# Patient Record
Sex: FEMALE | Race: WHITE | Employment: FULL TIME | URBAN - METROPOLITAN AREA
[De-identification: names, ages, dates, MRNs, and addresses within clinical notes are randomized per-mention and may not be internally consistent; named-entity substitution may affect disease eponyms.]

---

## 2017-08-02 ENCOUNTER — OFFICE VISIT (OUTPATIENT)
Dept: FAMILY MEDICINE CLINIC | Age: 52
End: 2017-08-02

## 2017-08-02 VITALS
HEIGHT: 62 IN | DIASTOLIC BLOOD PRESSURE: 73 MMHG | WEIGHT: 133 LBS | BODY MASS INDEX: 24.48 KG/M2 | RESPIRATION RATE: 16 BRPM | TEMPERATURE: 98.5 F | SYSTOLIC BLOOD PRESSURE: 107 MMHG | OXYGEN SATURATION: 98 % | HEART RATE: 77 BPM

## 2017-08-02 DIAGNOSIS — Z80.3 FH: BREAST CANCER: ICD-10-CM

## 2017-08-02 DIAGNOSIS — R32 URINARY INCONTINENCE, UNSPECIFIED TYPE: ICD-10-CM

## 2017-08-02 DIAGNOSIS — Z12.11 COLON CANCER SCREENING: ICD-10-CM

## 2017-08-02 DIAGNOSIS — Z00.00 HEALTHCARE MAINTENANCE: Primary | ICD-10-CM

## 2017-08-02 DIAGNOSIS — E55.9 UNSPECIFIED VITAMIN D DEFICIENCY: ICD-10-CM

## 2017-08-02 DIAGNOSIS — Z98.890 S/P COLONOSCOPY: ICD-10-CM

## 2017-08-02 DIAGNOSIS — Z12.31 ENCOUNTER FOR SCREENING MAMMOGRAM FOR BREAST CANCER: ICD-10-CM

## 2017-08-02 DIAGNOSIS — H00.015 HORDEOLUM EXTERNUM OF LEFT LOWER EYELID: ICD-10-CM

## 2017-08-02 DIAGNOSIS — E11.9 TYPE 2 DIABETES MELLITUS WITHOUT COMPLICATION, UNSPECIFIED LONG TERM INSULIN USE STATUS: ICD-10-CM

## 2017-08-02 DIAGNOSIS — Z00.00 HEALTH CARE MAINTENANCE: Primary | ICD-10-CM

## 2017-08-02 LAB
ALBUMIN UR QL STRIP: 30 MG/L
CREATININE, URINE POC: 200 MG/DL
MICROALBUMIN/CREAT RATIO POC: <30 MG/G

## 2017-08-02 RX ORDER — GENTAMICIN SULFATE 3 MG/ML
2 SOLUTION/ DROPS OPHTHALMIC 3 TIMES DAILY
Qty: 1 BOTTLE | Refills: 0 | Status: SHIPPED | OUTPATIENT
Start: 2017-08-02 | End: 2017-08-07

## 2017-08-02 NOTE — LETTER
8/2/2017 3:43 PM 
 
Ms. Stacie Castano [address] [facility] [phone] Focus on regular exercise (150 minutes each week) and healthy eating. Eat more fruits and vegetables. Eat more protein (egg whites, beans, and nuts you know you tolerate) and less carbohydrates (white bread, white rice, white pasta, white potatoes, sodas, and sweets). Eat appropriately small portion sizes. Keep up with female exams Return your stool cards when ready Flu shot this fall Eye exam yearly Obtain mammogram 
 
Please call Madeline Malone to help arrange and authorize any tests and/or referrals. Her # is [phone] Central Scheduling at [facility] is #[phone] Sincerely, Vanessa Watkins MD

## 2017-08-02 NOTE — PATIENT INSTRUCTIONS
In general, I advise patients to be as active as possible. I believe exercise is the key to long life and good health. The current recommendation is for individuals to exercise for 150 minutes each week (in other words 30 minutes 5 days a week). Exercise should be vigorous enough to work up a sweat. These activities include brisk walking, running, tennis, swimming, weight-lifting, etc.     I usually tell folks that work is work and exercise is exercise. Each of these activities has a different goal.  So build dedicated exercise time into your routine. Eat more protein (egg whites, beans, and nuts you know for sure that you tolerate) and less carbohydrates (white bread, white rice, white pasta, white potatoes, sodas, and sweets). Eat appropriately small portion sizes. You may also wish to look into the Mediterranean or the DASH Diet:     If any patient drinks alcohol, I typically suggest that a male drink no more than 2 beers (or glasses of wine, or shots of liquor) in any 24 hour period (and not daily). For females, the limits are one drink per 24 hours (and not daily). After these limits, the toxic effects of alcohol consumption start to manifest.     Avoid tobacco products. I can provide separate instructions for smoking cessation strategies if needed. I suggest a wellness exam yearly during your birth month to update health maintenance issues such as fasting labs, EKGs and other studies, appropriate cancer screenings,  immunizations, etc.      I suggest a yearly flu shot    If needed, please call Noemy Tucker to help arrange and authorize any tests or referrals. Her # is [phone]. Tests at [facility] can be scheduled by calling #[phone].

## 2017-08-02 NOTE — MR AVS SNAPSHOT
Visit Information Date & Time Provider Department Dept. Phone Encounter #  
 8/2/2017  4:00 PM Jamshid Hernandez MD [facility] [phone] [id number] Upcoming Health Maintenance Date Due Pneumococcal 19-64 Medium Risk (1 of 1 - PPSV23) 3/13/1982 EYE EXAM RETINAL OR DILATED Q1 1/19/2016 FOBT Q 1 YEAR AGE 50-75 2/19/2016 BREAST CANCER SCRN MAMMOGRAM 7/1/2016 HEMOGLOBIN A1C Q6M 11/3/2016 FOOT EXAM Q1 5/3/2017 MICROALBUMIN Q1 5/3/2017 LIPID PANEL Q1 5/3/2017 INFLUENZA AGE 9 TO ADULT 10/31/2017* PAP AKA CERVICAL CYTOLOGY 8/2/2018* DTaP/Tdap/Td series (2 - Td) 1/11/2022 *Topic was postponed. The date shown is not the original due date. Allergies as of 8/2/2017  Review Complete On: 8/2/2017 By: Rob Lopez LPN No Known Allergies Current Immunizations  Reviewed on 2/19/2015 Name Date Influenza Vaccine 11/10/2014 Influenza Vaccine Split 10/9/2012, 10/1/2011 Pneumococcal Vaccine (Pcv) 12/11/2008 TD Vaccine 2/2/2000 TDAP Vaccine 1/11/2012 Not reviewed this visit You Were Diagnosed With   
  
 Codes Comments Healthcare maintenance    -  Primary ICD-10-CM: Z00.00 ICD-9-CM: V70.0 Hordeolum externum of left lower eyelid     ICD-10-CM: H00.015 
ICD-9-CM: 373.11 Urinary incontinence, unspecified type     ICD-10-CM: R32 
ICD-9-CM: 788.30 S/P colonoscopy     ICD-10-CM: D20.508 ICD-9-CM: V45.89 Vitals BP Pulse Temp Resp Height(growth percentile) Weight(growth percentile) 107/73 (BP 1 Location: Right arm, BP Patient Position: Sitting) 77 98.5 °F (36.9 °C) (Oral) 16 5' 2\" (1.575 m) 133 lb (60.3 kg) LMP SpO2 BMI OB Status Smoking Status 02/11/2011 98% 24.33 kg/m2 Postmenopausal Former Smoker Vitals History BMI and BSA Data Body Mass Index Body Surface Area  
 24.33 kg/m 2 1.62 m 2 Preferred Pharmacy Pharmacy Name Phone [facility] [address]  [facility] [phone] Your Updated Medication List  
  
   
This list is accurate as of: 8/2/17  4:39 PM.  Always use your most recent med list.  
  
  
  
  
 gentamicin 0.3 % ophthalmic solution Commonly known as:  GARAMYCIN Administer 2 Drops to left eye three (3) times daily for 5 days. MULTIVITAMIN PO Take  by mouth daily. Prescriptions Sent to Pharmacy Refills  
 gentamicin (GARAMYCIN) 0.3 % ophthalmic solution 0 Sig: Administer 2 Drops to left eye three (3) times daily for 5 days. Class: Normal  
 Pharmacy: [address] [place] [address] [address], [place] - [address] AT [facility] of [address] ([facility] #: [phone] Route: Left Eye We Performed the Following REFERRAL TO UROLOGY [TSK452 Custom] Comments:  
 Please evaluate patient for incontinence Dr. Trish Carrasco #[phone] Referral Information Referral ID Referred By Referred To  
  
 [id number] Merlinda Marysol Not Available Visits Status Start Date End Date 1 New Request 8/2/17 8/2/18 If your referral has a status of pending review or denied, additional information will be sent to support the outcome of this decision. Patient Instructions In general, I advise patients to be as active as possible. I believe exercise is the key to long life and good health. The current recommendation is for individuals to exercise for 150 minutes each week (in other words 30 minutes 5 days a week). Exercise should be vigorous enough to work up a sweat. These activities include brisk walking, running, tennis, swimming, weight-lifting, etc.  
 
I usually tell folks that work is work and exercise is exercise. Each of these activities has a different goal.  So build dedicated exercise time into your routine. Eat more protein (egg whites, beans, and nuts you know for sure that you tolerate) and less carbohydrates (white bread, white rice, white pasta, white potatoes, sodas, and sweets). Eat appropriately small portion sizes. You may also wish to look into the Mediterranean or the DASH Diet:  
 
If any patient drinks alcohol, I typically suggest that a male drink no more than 2 beers (or glasses of wine, or shots of liquor) in any 24 hour period (and not daily). For females, the limits are one drink per 24 hours (and not daily). After these limits, the toxic effects of alcohol consumption start to manifest.  
 
Avoid tobacco products. I can provide separate instructions for smoking cessation strategies if needed. I suggest a wellness exam yearly during your birth month to update health maintenance issues such as fasting labs, EKGs and other studies, appropriate cancer screenings,  immunizations, etc.   
 
I suggest a yearly flu shot If needed, please call Nate Bruce to help arrange and authorize any tests or referrals. Her # is [phone]. Tests at [facility] facilities can be scheduled by calling #[phone]. Introducing [facility]! [facility] introduces [employer] patient portal. Now you can access parts of your medical record, email your doctor's office, and request medication refills online. 1. In your internet browser, go to [email or web address] 2. Click on the First Time User? Click Here link in the Sign In box. You will see the New Member Sign Up page. 3. Enter your [employer] Access Code exactly as it appears below. You will not need to use this code after youve completed the sign-up process. If you do not sign up before the expiration date, you must request a new code. · [employer] Access Code: [email or web address] Expires: 10/31/2017  4:39 PM 
 
4.  Enter the last four digits of your Social Security Number (xxxx) and Date of Birth (mm/dd/yyyy) as indicated and click Submit. You will be taken to the next sign-up page. 5. Create a [employer] ID. This will be your [employer] login ID and cannot be changed, so think of one that is secure and easy to remember. 6. Create a [employer] password. You can change your password at any time. 7. Enter your Password Reset Question and Answer. This can be used at a later time if you forget your password. 8. Enter your e-mail address. You will receive e-mail notification when new information is available in [address] 9. Click Sign Up. You can now view and download portions of your medical record. 10. Click the Download Summary menu link to download a portable copy of your medical information. If you have questions, please visit the Frequently Asked Questions section of the [employer] website. Remember, [employer] is NOT to be used for urgent needs. For medical emergencies, dial 911. Now available from your iPhone and Android! Please provide this summary of care documentation to your next provider. Your primary care clinician is listed as Maci Isaac. If you have any questions after today's visit, please call [phone].

## 2017-08-02 NOTE — LETTER
8/4/2017 11:08 AM 
 
Ms. Kayden Velasquez [address] [facility] [phone] Dear Kayden Velasquez: 
 
Please find your most recent results below. Resulted Orders HEMOGLOBIN A1C WITH EAG Result Value Ref Range Hemoglobin A1c 6.6 (H) 4.8 - 5.6 % Comment:  
            Pre-diabetes: 5.7 - 6.4 Diabetes: >6.4 Glycemic control for adults with diabetes: <7.0 Estimated average glucose 143 mg/dL Narrative Performed at:  [address]  [id number] : Richard Hamlin MD, Phone:  [phone] CHOLESTEROL, HDL Result Value Ref Range HDL Cholesterol 58 >39 mg/dL Narrative Performed at:  [address]  [id number] : Richard Hamlin MD, Phone:  [phone] CHOLESTEROL, TOTAL Result Value Ref Range Cholesterol, total 237 (H) 100 - 199 mg/dL Narrative Performed at:  [address]  [id number] : Richard Hamlin MD, Phone:  [phone] LDL, DIRECT Result Value Ref Range LDL,Direct 151 (H) 0 - 99 mg/dL Narrative Performed at:  [address]  [id number] : Richard Hamlin MD, Phone:  [phone] METABOLIC PANEL, BASIC Result Value Ref Range Glucose 171 (H) 65 - 99 mg/dL Comment:  
   Specimen received hemolyzed. Clinical correlation indicated. BUN 20 6 - 24 mg/dL Creatinine 0.71 0.57 - 1.00 mg/dL GFR est non-AA 97 >59 mL/min/1.73 GFR est  >59 mL/min/1.73  
 BUN/Creatinine ratio 28 (H) 9 - 23 Sodium 139 134 - 144 mmol/L Potassium 4.3 3.5 - 5.2 mmol/L Chloride 98 96 - 106 mmol/L  
 CO2 22 18 - 29 mmol/L Calcium 9.7 8.7 - 10.2 mg/dL Narrative Performed at:  [address]  [id number] : Richard Hamlin MD, Phone:  [phone] AMB POC URINE, MICROALBUMIN, SEMIQUANT (3 RESULTS) Result Value Ref Range ALBUMIN, URINE POC 30 Negative mg/L  
 CREATININE, URINE  mg/dL Microalbumin/creat ratio (POC) <30 MG/G Comment:  
   Normal  
CBC W/O DIFF Result Value Ref Range WBC 9.1 3.4 - 10.8 x10E3/uL  
 RBC 5.04 3.77 - 5.28 x10E6/uL HGB 14.7 11.1 - 15.9 g/dL HCT 44.3 34.0 - 46.6 % MCV 88 79 - 97 fL  
 MCH 29.2 26.6 - 33.0 pg  
 MCHC 33.2 31.5 - 35.7 g/dL  
 RDW 13.6 12.3 - 15.4 % PLATELET 532 712 - 581 x10E3/uL Narrative Performed at:  [address]  [id number] : Enrique Castillo MD, Phone:  [phone] ALT Result Value Ref Range ALT (SGPT) 22 0 - 32 IU/L Narrative Performed at:  [address]  [id number] : Enrique Castillo MD, Phone:  [phone] VITAMIN D, 25 HYDROXY Result Value Ref Range VITAMIN D, 25-HYDROXY 30.1 30.0 - 100.0 ng/mL Comment:  
   Vitamin D deficiency has been defined by the [address] [place] [address] practice guideline as a 
level of serum 25-OH vitamin D less than 20 ng/mL (1,2). The Endocrine Society went on to further define vitamin D 
insufficiency as a level between 21 and 29 ng/mL (2). 1. IOM ([place] of Medicine). 2010. Dietary reference 
   intakes for calcium and D. [facility]: The 
   [place]. 2. Hector MF, Piotr NC, Qiana HUNT, et al. 
   Evaluation, treatment, and prevention of vitamin D 
   deficiency: an Endocrine Society clinical practice 
   guideline. JCEM. 2011 Jul; 96(7):1911-30. Narrative Performed at:  [address]  [id number] : Enrique Castillo MD, Phone:  [phone] CVD REPORT Result Value Ref Range INTERPRETATION Note Comment:  
   Supplement report is available. Narrative Performed at:  [address] [place] [address]  [id number] : Harriet Alpers PhD, Phone:  [phone] DIABETES PATIENT EDUCATION Result Value Ref Range PDF Image Not applicable Narrative Performed at:  [address] [place] [address]  [id number] : Harriet Alpers PhD, Phone:  [phone] Your labs are fairly stable. Focus on regular exercise (150 minutes each week) and healthy eating.  Eat more fruits and vegetables.  Eat more protein (egg whites, beans, and nuts you know you tolerate) and less carbohydrates (white bread, white rice, white pasta, white potatoes, sodas, and sweets).  Eat appropriately small portion sizes. Sincerely, Sage Acevedo MD

## 2017-08-02 NOTE — PROGRESS NOTES
Gordon Willson is a 45 y.o. female      Issues discussed today include:        Signs and symptoms:  Stress incontinence  Duration:  months  Context:  annoying  Location:    Quality:  With cough or sneeze  Severity:  No inections  Timing:  Comes goes  Modifying factors:  Refer urologist    Data reviewed or ordered today:  Non fasting labs    WELLNESS     GYN:  jordan  Mammogram:  Needs 2017  LMP:  2012  last pap:  2016  DEXA:  Done and normal    Hearing screen:   done  Vision screening:   done  Depression screening:   done  Fall assessment:   done    BMI: Body mass index is 24.33 kg/(m^2). Colonoscopy:  2014  FOBT:  2017  TDAP:  2012  Pneumovax:  2017  PCV-13:  CONSIDER 2018  Flu shot:  Sandra Charm 2017  Zostavax:  NOT YET  Eye exam:  Needs 2017    FTF for DME:  done:  none  Advanced directive:  Full code  Specialists:  GYN [facility]  Urology Jasson Castro    In general, I advise patients to be as active as possible. I believe exercise is the key to long life and good health. The current recommendation is for individuals to exercise for 150 minutes each week (in other words 30 minutes 5 days a week). Exercise should be vigorous enough to work up a sweat. These activities include brisk walking, running, tennis, swimming, weight-lifting, etc.     I usually tell folks that work is work and exercise is exercise. Each of these activities has a different goal.  Even though you may be active at work, it may not be aerobically adequate. So build dedicated exercise time into your weekly routine. If a patient drinks alcohol, I suggest that a male drink only 2 beers (or glasses of wine, or shots of liquor) in any 24 hour period ( and not daily). For females, the limits are one drink per 24 hours (and not daily). After these limits, the toxic effects of alcohol consumption start to manifest.     Avoid tobacco products.   I may provide separate information discussing specific smoking cessation instructions if needed. I suggest a wellness exam yearly during your birth month to update health maintenance issues such as fasting labs, EKGs and other studies, appropriate cancer screenings,  immunizations, etc.      I suggest a yearly flu shot    Please call Ari Smith to help arrange and authorize any tests or referrals. Her # is [phone]         Other problems include:  Patient Active Problem List   Diagnosis Code    DM (diabetes mellitus), type 2 ([facility] E11.9    Grief reaction F43.20    Perimenopausal N95.1    History of normal resting EKG [id number]    S/P colonoscopy Z98.890    Unspecified vitamin D deficiency E55.9    FH: breast cancer Z80.3       Medications:  Current Outpatient Prescriptions   Medication Sig Dispense Refill    gentamicin (GARAMYCIN) 0.3 % ophthalmic solution Administer 2 Drops to left eye three (3) times daily for 5 days. 1 Bottle 0    MULTIVITAMINS (MULTIVITAMIN PO) Take  by mouth daily. Allergies:  No Known Allergies    LMP:  Patient's last menstrual period was 02/11/2011. Social History     Social History    Marital status:      Spouse name: N/A    Number of children: N/A    Years of education: N/A     Occupational History    Not on file. Social History Main Topics    Smoking status: Former Smoker     Packs/day: 0.25     Quit date: 5/3/1989    Smokeless tobacco: Never Used      Comment: quit 1989    Alcohol use Yes    Drug use: No    Sexual activity: Yes     Partners: Male     Other Topics Concern    Not on file     Social History Narrative         Family History   Problem Relation Age of Onset    Hypertension Mother          Meaningful use:  done      ROS:  Headaches:  no  Chest Pain:  no  SOB:  no  Fevers:  no  Falls:  no  anxiety/depression/losing interest in doing things that were previously enjoyed:  no.   PHQ2 = 0  Other significant ROS:    Patient denied problems with:    Hearing/vision, speaking/swallowing, Reflux/indigestion, Cough,Diarrhea/constipation,Problems passing or controlling urine,  Sexual function, Mood (anxiety/depression/losing interest in doing things that were previously enjoyed), Snoring/sleep apnea,Fatigue, Weight change, memory                                                         Any other Positive ROS include: sty left eye    Stress incontinence    Falls in the past 12 months:  no           Over the last year (or since your last visit):  Have you been diagnosed with heart attack, stroke, broken bones, or skin cancer = no    Exercise:  Needs more             Smoking history:  former                                   Patient's last menstrual period was 02/11/2011. Physical Exam  Visit Vitals    /73 (BP 1 Location: Right arm, BP Patient Position: Sitting)    Pulse 77    Temp 98.5 °F (36.9 °C) (Oral)    Resp 16    Ht 5' 2\" (1.575 m)    Wt 133 lb (60.3 kg)    LMP 02/11/2011    SpO2 98%    BMI 24.33 kg/m2     BP Readings from Last 3 Encounters:   08/02/17 107/73   05/03/16 95/64   02/19/15 115/78     Constitutional:  Appears well,  No Acute Distress, Vitals noted  Psychiatric:   Affect normal, Alert and cooperative, Oriented to person/place/time    Eyes:   Pupils equally round and reactive, EOMI, conjunctiva clear, eyelids:  Sty medail left lower lid internally  External ears and nose normal/lips, teeth=OK/gums normal, TMs and Orophyarynx normal  Neck:   general inspection and Thyroid normal.  No abnormal cervical or supraclavicular nodes    Lungs:   clear to auscultation, good respiratory effort  Heart: Ausculation normal.  Regular rhythm. No cardiac murmurs.   No carotid bruits or palpable thrills  Chest wall normal  Abd:  benign  Extremities:   without edema, good peripheral pulses  Skin:   Warm to palpation, without rashes, bruising, or suspicious lesions     Diabetic foot exam:      Sensation by vibration sense:  good  Monofilament test:  good  Skin integrity:  intact without lesions  Vascular: good circulation  Motor:  moves all toes, strength seems ok  No onychomycosis        Assessment:    Patient Active Problem List   Diagnosis Code    [facility] E11.9    Grief reaction F43.20    Perimenopausal N95.1    History of normal resting EKG [id number]    S/P colonoscopy Z98.890    Unspecified vitamin D deficiency E55.9    FH: breast cancer Z80.3       Today's diagnoses are:    ICD-10-CM ICD-9-CM    1. Healthcare maintenance Z00.00 V70.0    2. Hordeolum externum of left lower eyelid H00.015 373.11 gentamicin (GARAMYCIN) 0.3 % ophthalmic solution   3. Urinary incontinence, unspecified type R32 788.30 REFERRAL TO UROLOGY   4. S/P colonoscopy Z98.890 V45.89        Plan:  Orders Placed This Encounter    REFERRAL TO UROLOGY     Referral Priority:   Routine     Referral Type:   Consultation     Referral Reason:   Specialty Services Required     Requested Specialty:   Urology    gentamicin (GARAMYCIN) 0.3 % ophthalmic solution     Sig: Administer 2 Drops to left eye three (3) times daily for 5 days. Dispense:  1 Bottle     Refill:  0       See patient instructions  There are no Patient Instructions on file for this visit.       refresh note:  done    AVS Printed:  done

## 2017-08-03 PROBLEM — E78.9 LIPID DISORDER: Status: ACTIVE | Noted: 2017-08-03

## 2017-08-03 LAB
25(OH)D3+25(OH)D2 SERPL-MCNC: 30.1 NG/ML (ref 30–100)
ALT SERPL-CCNC: 22 IU/L (ref 0–32)
BUN SERPL-MCNC: 20 MG/DL (ref 6–24)
BUN/CREAT SERPL: 28 (ref 9–23)
CALCIUM SERPL-MCNC: 9.7 MG/DL (ref 8.7–10.2)
CHLORIDE SERPL-SCNC: 98 MMOL/L (ref 96–106)
CHOLEST SERPL-MCNC: 237 MG/DL (ref 100–199)
CO2 SERPL-SCNC: 22 MMOL/L (ref 18–29)
CREAT SERPL-MCNC: 0.71 MG/DL (ref 0.57–1)
ERYTHROCYTE [DISTWIDTH] IN BLOOD BY AUTOMATED COUNT: 13.6 % (ref 12.3–15.4)
EST. AVERAGE GLUCOSE BLD GHB EST-MCNC: 143 MG/DL
GLUCOSE SERPL-MCNC: 171 MG/DL (ref 65–99)
HBA1C MFR BLD: 6.6 % (ref 4.8–5.6)
HCT VFR BLD AUTO: 44.3 % (ref 34–46.6)
HDLC SERPL-MCNC: 58 MG/DL
HGB BLD-MCNC: 14.7 G/DL (ref 11.1–15.9)
INTERPRETATION, 910389: NORMAL
LDLC SERPL DIRECT ASSAY-MCNC: 151 MG/DL (ref 0–99)
Lab: NORMAL
MCH RBC QN AUTO: 29.2 PG (ref 26.6–33)
MCHC RBC AUTO-ENTMCNC: 33.2 G/DL (ref 31.5–35.7)
MCV RBC AUTO: 88 FL (ref 79–97)
PLATELET # BLD AUTO: 342 X10E3/UL (ref 150–379)
POTASSIUM SERPL-SCNC: 4.3 MMOL/L (ref 3.5–5.2)
RBC # BLD AUTO: 5.04 X10E6/UL (ref 3.77–5.28)
SODIUM SERPL-SCNC: 139 MMOL/L (ref 134–144)
WBC # BLD AUTO: 9.1 X10E3/UL (ref 3.4–10.8)

## 2017-08-03 NOTE — PROGRESS NOTES
Your labs are fairly stable. Focus on regular exercise (150 minutes each week) and healthy eating. Eat more fruits and vegetables. Eat more protein (egg whites, beans, and nuts you know you tolerate) and less carbohydrates (white bread, white rice, white pasta, white potatoes, sodas, and sweets). Eat appropriately small portion sizes.

## 2017-10-23 ENCOUNTER — TELEPHONE (OUTPATIENT)
Dept: FAMILY MEDICINE CLINIC | Age: 52
End: 2017-10-23

## 2017-10-23 NOTE — TELEPHONE ENCOUNTER
Patient returning call to office in regards to referral letter she received. Patient states that she is still deciding as to whether or not she will see Urologist.      Patient also notes she see's Dr. Pranay Zhong and have been seeing him for years. Patient states her last appointment was 1/16/17.

## 2018-05-08 ENCOUNTER — OFFICE VISIT (OUTPATIENT)
Dept: FAMILY MEDICINE CLINIC | Age: 53
End: 2018-05-08

## 2018-05-08 VITALS
HEART RATE: 86 BPM | TEMPERATURE: 98.8 F | HEIGHT: 62 IN | DIASTOLIC BLOOD PRESSURE: 68 MMHG | BODY MASS INDEX: 24.84 KG/M2 | RESPIRATION RATE: 18 BRPM | WEIGHT: 135 LBS | OXYGEN SATURATION: 98 % | SYSTOLIC BLOOD PRESSURE: 103 MMHG

## 2018-05-08 DIAGNOSIS — M25.569 CHRONIC KNEE PAIN, UNSPECIFIED LATERALITY: ICD-10-CM

## 2018-05-08 DIAGNOSIS — E01.0 THYROMEGALY: Primary | ICD-10-CM

## 2018-05-08 DIAGNOSIS — N39.3 STRESS INCONTINENCE: ICD-10-CM

## 2018-05-08 DIAGNOSIS — G89.29 CHRONIC KNEE PAIN, UNSPECIFIED LATERALITY: ICD-10-CM

## 2018-05-08 DIAGNOSIS — E11.9 TYPE 2 DIABETES MELLITUS WITHOUT COMPLICATION, UNSPECIFIED WHETHER LONG TERM INSULIN USE (HCC): ICD-10-CM

## 2018-05-08 DIAGNOSIS — M19.042 OA (OSTEOARTHRITIS) OF FINGER, LEFT: ICD-10-CM

## 2018-05-08 NOTE — PATIENT INSTRUCTIONS
Keep up with female exams    Follow up with your specialists    Yearly eye exams    Labs today    xrays today    Focus on regular exercise (150 minutes each week) and healthy eating. Eat more fruits and vegetables. Eat more protein (egg whites, beans, and nuts you know you tolerate) and less carbohydrates (white bread, white rice, white pasta, white potatoes, sodas, and sweets). Eat appropriately small portion sizes.

## 2018-05-08 NOTE — LETTER
5/10/2018 4:30 PM 
 
Ms. Leatha Gomez [address] [place] [facility] [address] Dear Leatha Gomez: 
 
Please find your most recent results below. Resulted Orders TSH 3RD GENERATION Result Value Ref Range TSH 1.090 0.450 - 4.500 uIU/mL Narrative Performed at:  [address]  [id number] : Shelly Diaz MD, Phone:  [phone] T4, FREE Result Value Ref Range T4, Free 1.34 0.82 - 1.77 ng/dL Narrative Performed at:  [address]  [id number] : Shelly Diaz MD, Phone:  [phone] HEMOGLOBIN A1C WITH EAG Result Value Ref Range Hemoglobin A1c 7.0 (H) 4.8 - 5.6 % Comment:  
            Pre-diabetes: 5.7 - 6.4 Diabetes: >6.4 Glycemic control for adults with diabetes: <7.0 Estimated average glucose 154 mg/dL Narrative Performed at:  [address]  [id number] : Shelly Diaz MD, Phone:  [phone] METABOLIC PANEL, BASIC Result Value Ref Range Glucose 85 65 - 99 mg/dL BUN 15 6 - 24 mg/dL Creatinine 0.68 0.57 - 1.00 mg/dL GFR est non-AA 99 >59 mL/min/1.73 GFR est  >59 mL/min/1.73  
 BUN/Creatinine ratio 22 9 - 23 Sodium 143 134 - 144 mmol/L Potassium 4.3 3.5 - 5.2 mmol/L Chloride 103 96 - 106 mmol/L  
 CO2 21 18 - 29 mmol/L Calcium 9.7 8.7 - 10.2 mg/dL Narrative Performed at:  [address]  [id number] : Shelly Diaz MD, Phone:  [phone] LDL, DIRECT Result Value Ref Range LDL,Direct 127 (H) 0 - 99 mg/dL Narrative Performed at:  [address]  [id number] : Shelly Diaz MD, Phone:  [phone] RECOMMENDATIONS: 
 
Your thyroid function is normal.  
 
 Your sugars and cholesterol are still higher that desired. Focus on regular exercise (150 minutes each week) and healthy eating.  Eat more fruits and vegetables.  Eat more protein (egg whites, beans, and nuts you know you tolerate) and less carbohydrates (white bread, white rice, white pasta, white potatoes, sodas, and sweets).  Eat appropriately small portion sizes. Check fasting labs in August.  
 
 
Sincerely, Paz Gambino MD

## 2018-05-08 NOTE — PROGRESS NOTES
Shawna Fagan is a 52 y.o. female      Issues discussed today include:        Signs and symptoms:  Pain left hand joints  Duration:  Weeks   Context:  Knees also hurt  Location:  Both knees, left hand  Quality:  Looks like OA  Severity:  annoying  Timing:  Comes goes  Modifying factors:  Xray today. Use tylenol arthriis for pain    Data reviewed or ordered today:  labs    Other problems include:  Patient Active Problem List   Diagnosis Code    [facility] E11.9    Grief reaction F43.20    Perimenopausal N95.1    History of normal resting EKG [id number]    S/P colonoscopy Z98.890    Unspecified vitamin D deficiency E55.9    FH: breast cancer Z80.3    Lipid disorder E78.9    Stress incontinence N39.3       Medications:  Current Outpatient Prescriptions   Medication Sig Dispense Refill    cetirizine HCl (ZYRTEC PO) Take  by mouth.  MULTIVITAMINS (MULTIVITAMIN PO) Take  by mouth daily. Allergies:  No Known Allergies    LMP:  Patient's last menstrual period was 02/11/2011. Social History     Social History    Marital status:      Spouse name: N/A    Number of children: N/A    Years of education: N/A     Occupational History    Not on file. Social History Main Topics    Smoking status: Former Smoker     Packs/day: 0.25     Quit date: 5/3/1989    Smokeless tobacco: Never Used      Comment: quit 1989    Alcohol use Yes    Drug use: No    Sexual activity: Yes     Partners: Male     Other Topics Concern    Not on file     Social History Narrative         Family History   Problem Relation Age of Onset    Hypertension Mother          Meaningful use:  done      ROS:  Headaches:  no  Chest Pain:  no  SOB:  no  Fevers:  no  Falls:  no  anxiety/depression/losing interest in doing things that were previously enjoyed:  no. PHQ2 = 0  Other significant ROS:  Her dentist thought her thyroid was enlarged    Patient's last menstrual period was 02/11/2011.     Physical Exam  Visit Vitals    /68 (BP 1 Location: Right arm, BP Patient Position: Sitting)    Pulse 86    Temp 98.8 °F (37.1 °C) (Oral)    Resp 18    Ht 5' 2\" (1.575 m)    Wt 135 lb (61.2 kg)    LMP 02/11/2011    SpO2 98%    BMI 24.69 kg/m2     BP Readings from Last 3 Encounters:   05/08/18 103/68   08/02/17 107/73   05/03/16 95/64     Constitutional:  Appears well,  No Acute Distress, Vitals noted  Psychiatric:   Affect normal, Alert and cooperative, Oriented to person/place/time    Eyes:   Pupils equally round and reactive, EOMI, conjunctiva clear, eyelids normal  ENT:   External ears and nose normal/lips, teeth=OK/gums normal, TMs and Orophyarynx normal  Neck:   general inspection and Thyroid seems normal.  No abnormal cervical or supraclavicular nodes    Lungs:   clear to auscultation, good respiratory effort  Heart: Ausculation normal.  Regular rhythm. No cardiac murmurs. No carotid bruits or palpable thrills  Chest wall normal  Abdominal exam:   normal.  Liver and spleen normal.  No bruits/masses/tenderness    Extremities:   without edema, good peripheral pulses  Skin:   Warm to palpation, without rashes, bruising, or suspicious lesions   MSK:  ?OA left hand and knees        Assessment:    Patient Active Problem List   Diagnosis Code    [facility] E11.9    Grief reaction F43.20    Perimenopausal N95.1    History of normal resting EKG [id number]    S/P colonoscopy Z98.890    Unspecified vitamin D deficiency E55.9    FH: breast cancer Z80.3    Lipid disorder E78.9    Stress incontinence N39.3       Today's diagnoses are:    ICD-10-CM ICD-9-CM    1. Thyromegaly E01.0 240.9 TSH 3RD GENERATION      T4, FREE    I don;t see anything but her dentist was concerned   2. Stress incontinence N39.3 [id number]    3. OA (osteoarthritis) of finger, left M19.042 715.94 XR HAND LT MIN 3 V   4.  Chronic knee pain, unspecified laterality M25.569 719.46 XR KNEE LT 3 V    G89.29 338.29 XR KNEE RT 3 V   5. Type 2 diabetes mellitus without complication, unspecified whether long term insulin use (HCC) E11.9 250.00 HEMOGLOBIN A1C WITH EAG      METABOLIC PANEL, BASIC      AMB POC URINE, MICROALBUMIN, SEMIQUANT (3 RESULTS)      LDL, DIRECT       Plan:  Orders Placed This Encounter    XR HAND LT MIN 3 V     Standing Status:   Future     Standing Expiration Date:   6/7/2019     Order Specific Question:   Reason for Exam     Answer:   OA     Order Specific Question:   Is Patient Allergic to Contrast Dye? Answer:   Unknown    XR KNEE LT 3 V     Standing Status:   Future     Standing Expiration Date:   6/7/2019     Order Specific Question:   Reason for Exam     Answer:   OA     Order Specific Question:   Is Patient Allergic to Contrast Dye? Answer:   Unknown    XR KNEE RT 3 V     Standing Status:   Future     Standing Expiration Date:   6/7/2019     Order Specific Question:   Reason for Exam     Answer:   OA     Order Specific Question:   Is Patient Allergic to Contrast Dye? Answer:   Unknown    TSH 3RD GENERATION    T4, FREE    HEMOGLOBIN A1C WITH EAG    METABOLIC PANEL, BASIC    LDL, DIRECT    AMB POC URINE, MICROALBUMIN, SEMIQUANT (3 RESULTS)    cetirizine HCl (ZYRTEC PO)     Sig: Take  by mouth. See patient instructions  Patient Instructions   Keep up with female exams    Follow up with your specialists    Yearly eye exams    Labs today    xrays today    Focus on regular exercise (150 minutes each week) and healthy eating. Eat more fruits and vegetables. Eat more protein (egg whites, beans, and nuts you know you tolerate) and less carbohydrates (white bread, white rice, white pasta, white potatoes, sodas, and sweets). Eat appropriately small portion sizes. refresh note:  done    AVS Printed:  done    Diagnoses and all orders for this visit:    1.  Thyromegaly  Comments:  I don;t see anything but her dentist was concerned  Orders:  -     TSH 3RD GENERATION  -     T4, FREE    2. Stress incontinence    3. OA (osteoarthritis) of finger, left  -     XR HAND LT MIN 3 V; Future    4. Chronic knee pain, unspecified laterality  -     XR KNEE LT 3 V; Future  -     XR KNEE RT 3 V; Future    5. Type 2 diabetes mellitus without complication, unspecified whether long term insulin use (HCC)  Assessment & Plan:  Stable, based on history, physical exam and review of pertinent labs, studies and medications; meds reconciled; continue current treatment plan, lifestyle modifications recommended. Key Antihyperglycemic Medications     Patient is on no antihyperglycemic meds. Other Key Diabetic Medications     Patient is on no other key diabetic meds.         Lab Results   Component Value Date/Time    Hemoglobin A1c 6.6 08/02/2017 04:45 PM    Glucose 171 08/02/2017 04:45 PM    Creatinine 0.71 08/02/2017 04:45 PM    Microalbumin/creat ratio (POC) <30 08/02/2017 04:57 PM    Cholesterol, total 237 08/02/2017 04:45 PM    HDL Cholesterol 58 08/02/2017 04:45 PM    LDL,Direct 151 08/02/2017 04:45 PM     Diabetic Foot and Eye Exam HM Status   Topic Date Due    Eye Exam  01/02/2019 (Originally 1/19/2016)    Diabetic Foot Care  08/02/2018       Orders:  -     HEMOGLOBIN A1C WITH EAG  -     METABOLIC PANEL, BASIC  -     AMB POC URINE, MICROALBUMIN, SEMIQUANT (3 RESULTS)  -     LDL, DIRECT

## 2018-05-08 NOTE — MR AVS SNAPSHOT
[address] 
[phone] Patient: Nicholas Rodrigues MRN: [id number] :1965 Visit Information Date & Time Provider Department Dept. Phone Encounter #  
 2018  3:30 PM Rafael Reyes MD [address] [phone] [id number] Upcoming Health Maintenance Date Due  
 PAP AKA CERVICAL CYTOLOGY 2018* FOBT Q 1 YEAR AGE 50-75 2018* EYE EXAM RETINAL OR DILATED Q1 2019* Influenza Age 5 to Adult 2018 FOOT EXAM Q1 2018 MICROALBUMIN Q1 2018 LIPID PANEL Q1 2018 HEMOGLOBIN A1C Q6M 2018 BREAST CANCER SCRN MAMMOGRAM 2019 DTaP/Tdap/Td series (2 - Td) 2022 *Topic was postponed. The date shown is not the original due date. Allergies as of 2018  Review Complete On: 2018 By: Carl Vieyra LPN No Known Allergies Current Immunizations  Reviewed on 2017 Name Date Influenza Vaccine 11/10/2014 Influenza Vaccine Split 10/9/2012, 10/1/2011 Pneumococcal Polysaccharide (PPSV-23) 2017 Pneumococcal Vaccine (Pcv) 2008 TD Vaccine 2000 TDAP Vaccine 2012 Not reviewed this visit You Were Diagnosed With   
  
 Codes Comments Thyromegaly    -  Primary ICD-10-CM: E01.0 ICD-9-CM: 240.9 I don;t see anything but her dentist was concerned Stress incontinence     ICD-10-CM: N39.3 ICD-9-CM: [id number] OA (osteoarthritis) of finger, left     ICD-10-CM: Q60.477 ICD-9-CM: 715.94 Chronic knee pain, unspecified laterality     ICD-10-CM: M25.569, G89.29 ICD-9-CM: 719.46, 338.29 Type 2 diabetes mellitus without complication, unspecified whether long term insulin use (HCC)     ICD-10-CM: E11.9 ICD-9-CM: 250.00 Vitals BP Pulse Temp Resp Height(growth percentile) Weight(growth percentile)  103/68 (BP 1 Location: Right arm, BP Patient Position: Sitting) 86 98.8 °F (37.1 °C) (Oral) 18 5' 2\" (1.575 m) 135 lb (61.2 kg) LMP SpO2 BMI OB Status Smoking Status 02/11/2011 98% 24.69 kg/m2 Postmenopausal Former Smoker Vitals History BMI and BSA Data Body Mass Index Body Surface Area  
 24.69 kg/m 2 1.64 m 2 Preferred Pharmacy Pharmacy Name Phone [facility] [address]  [facility] [phone] Your Updated Medication List  
  
   
This list is accurate as of 5/8/18  4:11 PM.  Always use your most recent med list.  
  
  
  
  
 MULTIVITAMIN PO Take  by mouth daily. ZYRTEC PO Take  by mouth. We Performed the Following AMB POC URINE, MICROALBUMIN, SEMIQUANT (3 RESULTS) [50922 CPT(R)] HEMOGLOBIN A1C WITH EAG [84642 CPT(R)] LDL, DIRECT [id number] CPT(R)] METABOLIC PANEL, BASIC [10008 CPT(R)] T4, FREE [id number] CPT(R)] TSH 3RD GENERATION [89426 CPT(R)] To-Do List   
 05/08/2018 Imaging:  XR HAND LT MIN 3 V   
  
 05/08/2018 Imaging:  XR KNEE LT 3 V   
  
 05/08/2018 Imaging:  XR KNEE RT 3 V Patient Instructions Keep up with female exams Follow up with your specialists Yearly eye exams Labs today 
 
xrays today Focus on regular exercise (150 minutes each week) and healthy eating. Eat more fruits and vegetables. Eat more protein (egg whites, beans, and nuts you know you tolerate) and less carbohydrates (white bread, white rice, white pasta, white potatoes, sodas, and sweets). Eat appropriately small portion sizes. Introducing [facility]! [employer] introduces [employer] patient portal. Now you can access parts of your medical record, email your doctor's office, and request medication refills online. 1. In your internet browser, go to [email or web address] 2. Click on the First Time User? Click Here link in the Sign In box.  You will see the New Member Sign Up page. 3. Enter your Mi[employer] Access Code exactly as it appears below. You will not need to use this code after youve completed the sign-up process. If you do not sign up before the expiration date, you must request a new code. · [employer] Access Code: [facility]pires: 8/6/2018  4:11 PM 
 
4. Enter the last four digits of your Social Security Number (xxxx) and Date of Birth (mm/dd/yyyy) as indicated and click Submit. You will be taken to the next sign-up page. 5. Create a MiMediat ID. This will be your [employer] login ID and cannot be changed, so think of one that is secure and easy to remember. 6. Create a [employer] password. You can change your password at any time. 7. Enter your Password Reset Question and Answer. This can be used at a later time if you forget your password. 8. Enter your e-mail address. You will receive e-mail notification when new information is available in [address]. 9. Click Sign Up. You can now view and download portions of your medical record. 10. Click the Download Summary menu link to download a portable copy of your medical information. If you have questions, please visit the Frequently Asked Questions section of the [employer] website. Remember, [employer] is NOT to be used for urgent needs. For medical emergencies, dial 911. Now available from your iPhone and Android! Please provide this summary of care documentation to your next provider. Your primary care clinician is listed as Maci Isaac. If you have any questions after today's visit, please call [phone].

## 2018-05-08 NOTE — ASSESSMENT & PLAN NOTE
Stable, based on history, physical exam and review of pertinent labs, studies and medications; meds reconciled; continue current treatment plan, lifestyle modifications recommended. Key Antihyperglycemic Medications     Patient is on no antihyperglycemic meds. Other Key Diabetic Medications     Patient is on no other key diabetic meds.         Lab Results   Component Value Date/Time    Hemoglobin A1c 6.6 08/02/2017 04:45 PM    Glucose 171 08/02/2017 04:45 PM    Creatinine 0.71 08/02/2017 04:45 PM    Microalbumin/creat ratio (POC) <30 08/02/2017 04:57 PM    Cholesterol, total 237 08/02/2017 04:45 PM    HDL Cholesterol 58 08/02/2017 04:45 PM    LDL,Direct 151 08/02/2017 04:45 PM     Diabetic Foot and Eye Exam HM Status   Topic Date Due    Eye Exam  01/02/2019 (Originally 1/19/2016)   [address] [place] [address] Diabetic Foot Care  08/02/2018

## 2018-05-09 LAB
BUN SERPL-MCNC: 15 MG/DL (ref 6–24)
BUN/CREAT SERPL: 22 (ref 9–23)
CALCIUM SERPL-MCNC: 9.7 MG/DL (ref 8.7–10.2)
CHLORIDE SERPL-SCNC: 103 MMOL/L (ref 96–106)
CO2 SERPL-SCNC: 21 MMOL/L (ref 18–29)
CREAT SERPL-MCNC: 0.68 MG/DL (ref 0.57–1)
EST. AVERAGE GLUCOSE BLD GHB EST-MCNC: 154 MG/DL
GFR SERPLBLD CREATININE-BSD FMLA CKD-EPI: 114 ML/MIN/1.73
GFR SERPLBLD CREATININE-BSD FMLA CKD-EPI: 99 ML/MIN/1.73
GLUCOSE SERPL-MCNC: 85 MG/DL (ref 65–99)
HBA1C MFR BLD: 7 % (ref 4.8–5.6)
LDLC SERPL DIRECT ASSAY-MCNC: 127 MG/DL (ref 0–99)
POTASSIUM SERPL-SCNC: 4.3 MMOL/L (ref 3.5–5.2)
SODIUM SERPL-SCNC: 143 MMOL/L (ref 134–144)
T4 FREE SERPL-MCNC: 1.34 NG/DL (ref 0.82–1.77)
TSH SERPL DL<=0.005 MIU/L-ACNC: 1.09 UIU/ML (ref 0.45–4.5)

## 2018-05-10 NOTE — PROGRESS NOTES
Your thyroid function is normal.    Your sugars and cholesterol are still higher that desired. Focus on regular exercise (150 minutes each week) and healthy eating. Eat more fruits and vegetables. Eat more protein (egg whites, beans, and nuts you know you tolerate) and less carbohydrates (white bread, white rice, white pasta, white potatoes, sodas, and sweets). Eat appropriately small portion sizes.     Check fasting labs in August.

## 2018-05-18 ENCOUNTER — TELEPHONE (OUTPATIENT)
Dept: FAMILY MEDICINE CLINIC | Age: 53
End: 2018-05-18

## 2018-05-18 NOTE — TELEPHONE ENCOUNTER
----- Message from Travis Caller sent at 5/18/2018  2:45 PM EDT -----  Regarding: Dr. Nixon Torres is requesting a call back from the nurse to discuss lab results. Pt contact [phone].

## 2018-05-22 DIAGNOSIS — E11.9 TYPE 2 DIABETES MELLITUS WITHOUT COMPLICATION, UNSPECIFIED WHETHER LONG TERM INSULIN USE (HCC): Primary | ICD-10-CM

## 2018-05-22 RX ORDER — METFORMIN HYDROCHLORIDE 500 MG/1
500 TABLET ORAL 2 TIMES DAILY WITH MEALS
Qty: 30 TAB | Refills: 3 | Status: SHIPPED | OUTPATIENT
Start: 2018-05-22 | End: 2018-08-13 | Stop reason: SDUPTHER

## 2018-05-22 NOTE — TELEPHONE ENCOUNTER
Spoke with Ms. Pattersonrobb, she wanted to know, since her A1c is still elevated did you want her to start on a low dose Metformin? She was wondering if her thirst and hair breaking is from the diabetes and not thyroid related as she thought. Please advise.

## 2018-05-22 NOTE — TELEPHONE ENCOUNTER
Spoke with Ms. Pattersonrobb, she wanted to know, since her A1c is still elevated did you want her to start on a low dose Metformin? She was wondering if her thirst and hair breaking is from the diabetes and not thyroid related as she thought. Please advise.     We could try low dose metformin ONE pill once daily with her largest meal.  This was sent to her pharmacy

## 2018-05-22 NOTE — TELEPHONE ENCOUNTER
Called Ms. Gomez, relayed message about medication and that it has been sent to pharmacy. Patient verbalized understanding.

## 2018-07-31 ENCOUNTER — OFFICE VISIT (OUTPATIENT)
Dept: FAMILY MEDICINE CLINIC | Age: 53
End: 2018-07-31

## 2018-07-31 VITALS
HEART RATE: 79 BPM | WEIGHT: 135 LBS | OXYGEN SATURATION: 98 % | BODY MASS INDEX: 24.84 KG/M2 | DIASTOLIC BLOOD PRESSURE: 72 MMHG | HEIGHT: 62 IN | RESPIRATION RATE: 18 BRPM | TEMPERATURE: 98.7 F | SYSTOLIC BLOOD PRESSURE: 108 MMHG

## 2018-07-31 DIAGNOSIS — Z12.11 COLON CANCER SCREENING: ICD-10-CM

## 2018-07-31 DIAGNOSIS — E11.9 TYPE 2 DIABETES MELLITUS WITHOUT COMPLICATION, UNSPECIFIED WHETHER LONG TERM INSULIN USE (HCC): Primary | ICD-10-CM

## 2018-07-31 DIAGNOSIS — Z13.31 SCREENING FOR DEPRESSION: ICD-10-CM

## 2018-07-31 NOTE — PATIENT INSTRUCTIONS
Focus on regular exercise (150 minutes each week) and healthy eating. Eat more fruits and vegetables. Eat more protein (egg whites, beans, and nuts you know you tolerate) and less carbohydrates (white bread, white rice, white pasta, white potatoes, sodas, and sweets). Eat appropriately small portion sizes.       Return your stool cards when ready      We can do 90 day supply of Rx after labs return

## 2018-07-31 NOTE — PROGRESS NOTES
Gurwinder Allison is a 52 y.o. female      Issues discussed today include: We discussed health maintenance:  She gets GYN exam 9/1/2019 Dr. Erin Centeno    BMI = Body mass index is 24.69 kg/(m^2). We discussed diet/exercise/healthy weight    We discussed avoiding tobacco products    We reviewed and updated pertinent past medical history in the problem list      Data reviewed or ordered today:  Non fasting labs today    Other problems include:  Patient Active Problem List   Diagnosis Code    [facility] E11.[facility]    Grief reaction F43.20    Perimenopausal N95.1    History of normal resting EKG [id number]    S/P colonoscopy Z98.890    Unspecified vitamin D deficiency E55.9    FH: breast cancer Z80.3    Lipid disorder E78.9    Stress incontinence N39.3       Medications:  Current Outpatient Prescriptions   Medication Sig Dispense Refill    metFORMIN (GLUCOPHAGE) 500 mg tablet Take 1 Tab by mouth two (2) times daily (with meals). 30 Tab 3    cetirizine HCl (ZYRTEC PO) Take  by mouth.  MULTIVITAMINS (MULTIVITAMIN PO) Take  by mouth daily. Allergies:  No Known Allergies    LMP:  Patient's last menstrual period was 02/11/2011. Social History     Social History    Marital status:      Spouse name: N/A    Number of children: N/A    Years of education: N/A     Occupational History    Not on file.      Social History Main Topics    Smoking status: Former Smoker     Packs/day: 0.25     Quit date: 5/3/1989    Smokeless tobacco: Never Used      Comment: quit 1989    Alcohol use Yes    Drug use: No    Sexual activity: Yes     Partners: Male     Other Topics Concern    Not on file     Social History Narrative         Family History   Problem Relation Age of Onset    Hypertension Mother          Meaningful use:  done      ROS:  Headaches:  no  Chest Pain:  no  SOB:  no  Fevers:  no  Falls:  no  anxiety/depression/losing interest in doing things that were previously enjoyed:  no. PHQ2 = 0  Other significant ROS:      Patient's last menstrual period was 02/11/2011. Physical Exam  Visit Vitals    /72 (BP 1 Location: Right arm, BP Patient Position: Sitting)    Pulse 79    Temp 98.7 °F (37.1 °C) (Oral)    Resp 18    Ht 5' 2\" (1.575 m)    Wt 135 lb (61.2 kg)    LMP 02/11/2011    SpO2 98%    BMI 24.69 kg/m2     BP Readings from Last 3 Encounters:   07/31/18 108/72   05/08/18 103/68   08/02/17 107/73     Constitutional:  Appears well,  No Acute Distress, Vitals noted  Psychiatric:   Affect normal, Alert and cooperative, Oriented to person/place/time    Eyes:   Pupils equally round and reactive, EOMI, conjunctiva clear, eyelids normal  ENT:   External ears and nose normal/lips, teeth=OK/gums normal, TMs and Orophyarynx normal  Neck:   general inspection and Thyroid normal.  No abnormal cervical or supraclavicular nodes    Lungs:   clear to auscultation, good respiratory effort  Heart: Ausculation normal.  Regular rhythm. No cardiac murmurs. No carotid bruits or palpable thrills  Chest wall normal  Abdominal exam:   normal.  Liver and spleen normal.  No bruits/masses/tenderness    Extremities:   without edema, good peripheral pulses  Skin:   Warm to palpation, without rashes, bruising, or suspicious lesions     Diabetic foot exam:      Sensation by vibration sense:  good  Monofilament test:  good  Skin integrity:  intact without lesions  Vascular:  good circulation  Motor:  moves all toes, strength seems ok  No onychomycosis        Assessment:    Patient Active Problem List   Diagnosis Code    DM (diabetes mellitus), type 2 ([facility] E11.9    Grief reaction F43.20    Perimenopausal N95.1    History of normal resting EKG [id number]    S/P colonoscopy Z98.890    Unspecified vitamin D deficiency E55.9    FH: breast cancer Z80.3    Lipid disorder E78.9    Stress incontinence N39.3       Today's diagnoses are:    ICD-10-CM ICD-9-CM    1.  Type 2 diabetes mellitus without complication, unspecified whether long term insulin use ([facility]) E11.9 250.00 LDL, DIRECT      AMB POC URINE, MICROALBUMIN, SEMIQUANT (3 RESULTS)      HEMOGLOBIN A1C WITH EAG      METABOLIC PANEL, BASIC       DIABETES FOOT EXAM       DIABETES EYE EXAM    she had OP exam 1/2018   2. Screening for depression Z13.89 V79.0 OK DEPRESSION SCREEN ANNUAL       Plan:  Orders Placed This Encounter    LDL, DIRECT    HEMOGLOBIN A1C WITH EAG    METABOLIC PANEL, BASIC    AMB POC URINE, MICROALBUMIN, SEMIQUANT (3 RESULTS)     DIABETES FOOT EXAM     DIABETES EYE EXAM    OK DEPRESSION SCREEN ANNUAL       See patient instructions  There are no Patient Instructions on file for this visit. refresh note:  done    AVS Printed:  done      Diagnoses and all orders for this visit:    1.  Type 2 diabetes mellitus without complication, unspecified whether long term insulin use ([facility]  Comments:  she had OP exam 1/2018  Orders:  -     LDL, DIRECT  -     AMB POC URINE, MICROALBUMIN, SEMIQUANT (3 RESULTS)  -     HEMOGLOBIN A1C WITH EAG  -     METABOLIC PANEL, BASIC  -      DIABETES FOOT EXAM  -      DIABETES EYE EXAM    2. Screening for depression  -     [address] [employer]

## 2018-07-31 NOTE — MR AVS SNAPSHOT
[address] 
[phone] Patient: Livia Krishna MRN: [id number] :1965 Visit Information Date & Time Provider Department Dept. Phone Encounter #  
 2018  2:50 PM Jose Walsh MD [address] [place] [address] [phone] [id number] Upcoming Health Maintenance Date Due  
 EYE EXAM RETINAL OR DILATED Q1 2019* PAP AKA CERVICAL CYTOLOGY 2019* FOBT Q 1 YEAR AGE 50-75 2019* Influenza Age 5 to Adult 2018 HEMOGLOBIN A1C Q6M 2018 FOOT EXAM Q1 2019 MICROALBUMIN Q1 2019 LIPID PANEL Q1 2019 BREAST CANCER SCRN MAMMOGRAM 2019 DTaP/Tdap/Td series (2 - Td) 2022 *Topic was postponed. The date shown is not the original due date. Allergies as of 2018  Review Complete On: 2018 By: Jose Walsh MD  
 No Known Allergies Current Immunizations  Reviewed on 2017 Name Date Influenza Vaccine 11/10/2014 Influenza Vaccine Split 10/9/2012, 10/1/2011 Pneumococcal Polysaccharide (PPSV-23) 2017 Pneumococcal Vaccine (Pcv) 2008 TD Vaccine 2000 TDAP Vaccine 2012 Not reviewed this visit You Were Diagnosed With   
  
 Codes Comments Type 2 diabetes mellitus without complication, unspecified whether long term insulin use (HCC)    -  Primary ICD-10-CM: E11.9 ICD-9-CM: 250.00 she had OP exam 2018 Screening for depression     ICD-10-CM: Z13.89 ICD-9-CM: V79.0 Colon cancer screening     ICD-10-CM: Z12.11 ICD-9-CM: V76.51 Vitals BP Pulse Temp Resp Height(growth percentile) Weight(growth percentile) 108/72 (BP 1 Location: Right arm, BP Patient Position: Sitting) 79 98.7 °F (37.1 °C) (Oral) 18 5' 2\" (1.575 m) 135 lb (61.2 kg) LMP SpO2 BMI OB Status Smoking Status 2011 98% 24.69 kg/m2 Postmenopausal Former Smoker BMI and BSA Data Body Mass Index Body Surface Area  
 24.69 kg/m 2 1.64 m 2 Preferred Pharmacy Pharmacy Name Phone Cara Riz[facility], [facility] [phone] Your Updated Medication List  
  
   
This list is accurate as of 7/31/18  3:35 PM.  Always use your most recent med list.  
  
  
  
  
 metFORMIN 500 mg tablet Commonly known as:  GLUCOPHAGE Take 1 Tab by mouth two (2) times daily (with meals). MULTIVITAMIN PO Take  by mouth daily. ZYRTEC PO Take  by mouth. We Performed the Following AMB POC URINE, MICROALBUMIN, SEMIQUANT (3 RESULTS) [75845 CPT(R)] HEMOGLOBIN A1C WITH EAG [26328 CPT(R)]  DIABETES EYE EXAM [6 Custom]  DIABETES FOOT EXAM [7 Custom] LDL, DIRECT A656832 CPT(R)] METABOLIC PANEL, BASIC [94180 CPT(R)] OCCULT BLOOD IMMUNOASSAY,DIAGNOSTIC [60830 CPT(R)] [employer] [employer] [ [facility]] Patient Instructions Focus on regular exercise (150 minutes each week) and healthy eating. Eat more fruits and vegetables. Eat more protein (egg whites, beans, and nuts you know you tolerate) and less carbohydrates (white bread, white rice, white pasta, white potatoes, sodas, and sweets). Eat appropriately small portion sizes. Return your stool cards when ready Introducing [facility]! Mono Casey introduces Oration patient portal. Now you can access parts of your medical record, email your doctor's office, and request medication refills online. 1. In your internet browser, go to [email or web address] 2. Click on the First Time User? Click Here link in the Sign In box. You will see the New Member Sign Up page. 3. Enter your Oration Access Code exactly as it appears below. You will not need to use this code after youve completed the sign-up process. If you do not sign up before the expiration date, you must request a new code. · SkyBitz Access Code: [facility] Expires: 8/6/2018  4:11 PM 
 
4. Enter the last four digits of your Social Security Number (xxxx) and Date of Birth (mm/dd/yyyy) as indicated and click Submit. You will be taken to the next sign-up page. 5. Create a SkyBitz ID. This will be your SkyBitz login ID and cannot be changed, so think of one that is secure and easy to remember. 6. Create a SkyBitz password. You can change your password at any time. 7. Enter your Password Reset Question and Answer. This can be used at a later time if you forget your password. 8. Enter your e-mail address. You will receive e-mail notification when new information is available in [address] 9. Click Sign Up. You can now view and download portions of your medical record. 10. Click the Download Summary menu link to download a portable copy of your medical information. If you have questions, please visit the Frequently Asked Questions section of the SkyBitz website. Remember, SkyBitz is NOT to be used for urgent needs. For medical emergencies, dial 911. Now available from your iPhone and Android! Please provide this summary of care documentation to your next provider. Your primary care clinician is listed as Jeromy Olivier. If you have any questions after today's visit, please call [phone].

## 2018-07-31 NOTE — PROGRESS NOTES
1. Have you been to the ER, urgent care clinic since your last visit? Hospitalized since your last visit? No    2. Have you seen or consulted any other health care providers outside of the [address] [place] [address] since your last visit? Include any pap smears or colon screening. No    Chief Complaint   Patient presents with    Medication Refill    Other     diabetes lab work     Refill on Metformin if needed. Blood pressure 108/72, pulse 79, temperature 98.7 °F (37.1 °C), temperature source Oral, resp. rate 18, height 5' 2\" (1.575 m), weight 135 lb (61.2 kg), last menstrual period 02/11/2011, SpO2 98 %.

## 2018-08-01 LAB
BUN SERPL-MCNC: 13 MG/DL (ref 6–24)
BUN/CREAT SERPL: 18 (ref 9–23)
CALCIUM SERPL-MCNC: 9.7 MG/DL (ref 8.7–10.2)
CHLORIDE SERPL-SCNC: 102 MMOL/L (ref 96–106)
CO2 SERPL-SCNC: 25 MMOL/L (ref 20–29)
CREAT SERPL-MCNC: 0.71 MG/DL (ref 0.57–1)
EST. AVERAGE GLUCOSE BLD GHB EST-MCNC: 134 MG/DL
GLUCOSE SERPL-MCNC: 84 MG/DL (ref 65–99)
HBA1C MFR BLD: 6.3 % (ref 4.8–5.6)
LDLC SERPL DIRECT ASSAY-MCNC: 109 MG/DL (ref 0–99)
POTASSIUM SERPL-SCNC: 4.7 MMOL/L (ref 3.5–5.2)
SODIUM SERPL-SCNC: 142 MMOL/L (ref 134–144)

## 2018-08-06 NOTE — PROGRESS NOTES
Your cholesterol and sugars continue to improve. Keep up the good work. Obtain a flu vaccine around [address].

## 2018-08-13 DIAGNOSIS — E11.9 TYPE 2 DIABETES MELLITUS WITHOUT COMPLICATION, UNSPECIFIED WHETHER LONG TERM INSULIN USE (HCC): ICD-10-CM

## 2018-08-13 NOTE — TELEPHONE ENCOUNTER
----- Message from Rayshawn Patiño sent at 8/13/2018  9:18 AM EDT -----  Regarding: /Matthew  Pt would like a script for \"metformin\" to be sent to [address] [facility] contact is [phone]    Pt's best contact is [phone]

## 2018-08-14 RX ORDER — METFORMIN HYDROCHLORIDE 500 MG/1
500 TABLET ORAL 2 TIMES DAILY WITH MEALS
Qty: 180 TAB | Refills: 1 | Status: SHIPPED | OUTPATIENT
Start: 2018-08-14 | End: 2019-10-08 | Stop reason: CLARIF

## 2019-10-08 ENCOUNTER — OFFICE VISIT (OUTPATIENT)
Dept: FAMILY MEDICINE CLINIC | Age: 54
End: 2019-10-08

## 2019-10-08 VITALS
DIASTOLIC BLOOD PRESSURE: 70 MMHG | HEART RATE: 76 BPM | RESPIRATION RATE: 16 BRPM | WEIGHT: 134 LBS | OXYGEN SATURATION: 97 % | HEIGHT: 62 IN | SYSTOLIC BLOOD PRESSURE: 101 MMHG | BODY MASS INDEX: 24.66 KG/M2 | TEMPERATURE: 98.4 F

## 2019-10-08 DIAGNOSIS — M67.432 GANGLION CYST OF WRIST, LEFT: ICD-10-CM

## 2019-10-08 DIAGNOSIS — E11.9 TYPE 2 DIABETES MELLITUS WITHOUT COMPLICATION, UNSPECIFIED WHETHER LONG TERM INSULIN USE (HCC): ICD-10-CM

## 2019-10-08 DIAGNOSIS — Z00.00 WELL WOMAN EXAM WITHOUT GYNECOLOGICAL EXAM: Primary | ICD-10-CM

## 2019-10-08 DIAGNOSIS — E78.5 HYPERLIPIDEMIA, UNSPECIFIED HYPERLIPIDEMIA TYPE: ICD-10-CM

## 2019-10-08 RX ORDER — METFORMIN HYDROCHLORIDE 500 MG/1
1000 TABLET, EXTENDED RELEASE ORAL
Qty: 60 TAB | Refills: 5 | Status: SHIPPED | OUTPATIENT
Start: 2019-10-08 | End: 2020-08-17 | Stop reason: SDUPTHER

## 2019-10-08 NOTE — PROGRESS NOTES
Chief Complaint   Patient presents with    Complete Physical     Blood pressure 101/70, pulse 76, temperature 98.4 °F (36.9 °C), temperature source Oral, resp. rate 16, height 5' 2.4\" (1.585 m), weight 134 lb (60.8 kg), last menstrual period 02/11/2011, SpO2 97 %. 1. Have you been to the ER, urgent care clinic since your last visit? Hospitalized since your last visit? No    2. Have you seen or consulted any other health care providers outside of the [address] [place] [address] since your last visit? Include any pap smears or colon screening.  No

## 2019-10-08 NOTE — PROGRESS NOTES
Chief Complaint   Patient presents with    Complete Physical       Subjective: Pamula Saint is a 62 y.o. female. Presenting for her annual checkup and diabetes follow up. DMII: Has been taking metformin 500mg in am, often forgets the pm dose > 50% of the time. She denies definite GI SEs from, but does belch more since starting it. No diarrhea. No sxs of high or low sugar. Not on statin or BP meds. Denies numbness/pain in feet. No wounds. No vision changes/loss. Smoking Hx: As a teenager only, quit > 30 yrs ago  Alcohol Use: Occasional, few weekends per month, never > 3 drinks at a time  Supplemental/OTC Meds: None  [id number]/Vit D36 (age > 48): Not currently  Osteoporosis screening (age > 72): DEXA 2012 wnl  Patient's last menstrual period was 2011. Post menopausal.    Preventive Screenings:  Last pelvic/PAP: Sees Ob/gyn (Dr. José Mcdaniel), up to date with those  Last mammogram: 2018  Results: Normal  Last BMD: Dexa 2012 normal      Last screening colonoscopy:   and  Results: Normal per pt, first one with concern for crohn's but repeat normal  Depression screening:    - Over the past two weeks, have you felt down, depressed, or hopeless? No, one of her daughters  in 2010 - has since battled grief, but denies depression. Has family support,  & 23 yo daughter   - Over the past two weeks, have you felt little interest or pleasure in doing things? No    Immunizations:   - Last TDAP (every 10 years):   - Last Pneumovax (age > 72): PPSV23 in 2017  - Shingles vaccine (age > 48): Rx sent to pharmacy  - Last Flu shot: 2018  Agrees to flu shot today? [facility]    Current Outpatient Medications   Medication Sig Dispense Refill    metFORMIN ER (GLUCOPHAGE XR) 500 mg tablet Take 2 Tabs by mouth daily (with breakfast). 60 Tab 5    varicella-zoster recombinant, PF, (SHINGRIX, PF,) 50 mcg/0.5 mL susr injection 0.5 mL by IntraMUSCular route once for 1 dose.  0.5 mL 0  cetirizine HCl (ZYRTEC PO) Take  by mouth.  MULTIVITAMINS (MULTIVITAMIN PO) Take  by mouth daily. Allergies: Patient has no known allergies. Social History     Socioeconomic History    Marital status:      Spouse name: Not on file    Number of children: Not on file    Years of education: Not on file    Highest education level: Not on file   Occupational History    Not on file   Social Needs    Financial resource strain: Not on file    Food insecurity:     Worry: Not on file     Inability: Not on file    Transportation needs:     Medical: Not on file     Non-medical: Not on file   Tobacco Use    Smoking status: Former Smoker     Packs/day: 0.25     Last attempt to quit: 5/3/1989     Years since quittin.4    Smokeless tobacco: Never Used    Tobacco comment: quit    Substance and Sexual Activity    Alcohol use:  Yes    Drug use: No    Sexual activity: Yes     Partners: Male   Lifestyle    Physical activity:     Days per week: Not on file     Minutes per session: Not on file    Stress: Not on file   Relationships    Social connections:     Talks on phone: Not on file     Gets together: Not on file     Attends Rastafarian service: Not on file     Active member of club or organization: Not on file     Attends meetings of clubs or organizations: Not on file     Relationship status: Not on file    Intimate partner violence:     Fear of current or ex partner: Not on file     Emotionally abused: Not on file     Physically abused: Not on file     Forced sexual activity: Not on file   Other Topics Concern    Not on file   Social History Narrative    Not on file     Family History   Problem Relation Age of Onset    Hypertension Mother      Patient Active Problem List   Diagnosis Code    [facility] E11.9    Grief reaction F43.21    Perimenopausal N95.1    History of normal resting EKG [id number]    S/P colonoscopy Z98.890    Unspecified vitamin D deficiency E55.9    FH: breast cancer Z80.3    Lipid disorder E78.9    Stress incontinence N39.3       ROS:  Feeling well. No dyspnea or chest pain on exertion. No abdominal pain, change in bowel habits, black or bloody stools. No urinary tract symptoms. No neurological complaints. Physical Exam:  Visit Vitals  /70   Pulse 76   Temp 98.4 °F (36.9 °C) (Oral)   Resp 16   Ht 5' 2.4\" (1.585 m)   Wt 134 lb (60.8 kg)   LMP 02/11/2011   SpO2 97%   BMI 24.19 kg/m²     General appearance: alert, cooperative, no distress, appears stated age  Head: Normocephalic, without obvious abnormality, atraumatic  Eyes: conjunctivae/corneas clear. PERRL, EOM's intact. Ears: normal TM's and external ear canals AU  Nose: Nares normal. Septum midline. Mucosa normal.  Throat: Lips, mucosa, and tongue normal. Teeth and gums normal  Neck: supple, symmetrical, trachea midline, no adenopathy, thyroid: not enlarged, symmetric, no tenderness/mass/nodules  Back: symmetric, no curvature. ROM normal. No CVA tenderness. Lungs: clear to auscultation bilaterally, no wheezes, no increased work of breathing  Heart: regular rate and rhythm, S1, S2 normal, no murmur, click, rub or gallop  Abdomen: soft, non-tender. Bowel sounds normal. No masses,  no organomegaly  Extremities: extremities normal, atraumatic, no cyanosis or edema  Pulses: 2+ and symmetric  Skin: Skin color, texture, turgor normal. No rashes or lesions  MSK: left lateral volar wrist with 0.8cm x 0.8cm mildly tender nodule  Lymph nodes: Cervical, supraclavicular nodes normal.  Neurologic: Alert and oriented X 3, normal strength and tone. Normal symmetric reflexes. Normal coordination and gait  Feet: FROM without tenderness. No erythema, skin breakdown or ulceration. No nail changes. Sensation intact to microfilament testing throughout. BREAST EXAM: not examined  PELVIC EXAM: examination not indicated      Assessment and Plan:    Diagnoses and all orders for this visit:    1. Well woman exam without gynecological exam    2. Type 2 diabetes mellitus without complication, unspecified whether long term insulin use (HCC)  -     LIPID PANEL  -     MICROALBUMIN, UR, RAND W/ MICROALB/CREAT RATIO  -     METABOLIC PANEL, BASIC  -     HEMOGLOBIN A1C WITH EAG    3. Hyperlipidemia, unspecified hyperlipidemia type    4. Ganglion cyst of wrist, left    Other orders  -     metFORMIN ER (GLUCOPHAGE XR) 500 mg tablet; Take 2 Tabs by mouth daily (with breakfast). -     varicella-zoster recombinant, PF, (SHINGRIX, PF,) 50 mcg/0.5 mL susr injection; 0.5 mL by IntraMUSCular route once for 1 dose. Body mass index is 24.19 kg/m². Normal  Nonsmoker. Healthy etoh intake. Well controlled DMII in the past, will switch metformin to XL given difficulty remembering pm dose  Will monitor wrist nodule, pt to return if increasing in size or new/concerning sxs    I have discussed with pt the following topics:   - Reviewed diet, exercise and weight control  - Reviewed medications and side effects in detail  - Recommended exercising for 30 minutes daily  - I suggest a complete physical exam yearly to update health maintenance issues such as fasting labs, EKGs and other studies, immunizations, etc.     AVS was printed, given to patient and briefly discussed prior to patient's departure from the office today.       Leti Ragland MD  [address]   [address] [facility], [place]

## 2019-10-08 NOTE — PATIENT INSTRUCTIONS
Well Visit, Women 48 to 72: Care Instructions  Your Care Instructions    Physical exams can help you stay healthy. Your doctor has checked your overall health and may have suggested ways to take good care of yourself. He or she also may have recommended tests. At home, you can help prevent illness with healthy eating, regular exercise, and other steps. Follow-up care is a key part of your treatment and safety. Be sure to make and go to all appointments, and call your doctor if you are having problems. It's also a good idea to know your test results and keep a list of the medicines you take. How can you care for yourself at home? · Reach and stay at a healthy weight. This will lower your risk for many problems, such as obesity, diabetes, heart disease, and high blood pressure. · Get at least 30 minutes of exercise on most days of the week. Walking is a good choice. You also may want to do other activities, such as running, swimming, cycling, or playing tennis or team sports. · Do not smoke. Smoking can make health problems worse. If you need help quitting, talk to your doctor about stop-smoking programs and medicines. These can increase your chances of quitting for good. · Protect your skin from too much sun. When you're outdoors from 10 a.m. to 4 p.m., stay in the shade or cover up with clothing and a hat with a wide brim. Wear sunglasses that block UV rays. Even when it's cloudy, put broad-spectrum sunscreen (SPF 30 or higher) on any exposed skin. · See a dentist one or two times a year for checkups and to have your teeth cleaned. · Wear a seat belt in the car. Follow your doctor's advice about when to have certain tests. These tests can spot problems early. · Cholesterol. Your doctor will tell you how often to have this done based on your age, family history, or other things that can increase your risk for heart attack and stroke. · Blood pressure.  Have your blood pressure checked during a routine doctor visit. Your doctor will tell you how often to check your blood pressure based on your age, your blood pressure results, and other factors. · Mammogram. Ask your doctor how often you should have a mammogram, which is an X-ray of your breasts. A mammogram can spot breast cancer before it can be felt and when it is easiest to treat. · Pap test and pelvic exam. Ask your doctor how often you should have a Pap test. You may not need to have a Pap test as often as you used to. · Vision. Have your eyes checked every year or two or as often as your doctor suggests. Some experts recommend that you have yearly exams for glaucoma and other age-related eye problems starting at age 48. · Hearing. Tell your doctor if you notice any change in your hearing. You can have tests to find out how well you hear. · Diabetes. Ask your doctor whether you should have tests for diabetes. · Colorectal cancer. Your risk for colorectal cancer gets higher as you get older. Some experts say that adults should start regular screening at age 48 and stop at age 76. Others say to start before age 48 or continue after age 76. Talk with your doctor about your risk and when to start and stop screening. · Thyroid disease. Talk to your doctor about whether to have your thyroid checked as part of a regular physical exam. Women have an increased chance of a thyroid problem. · Osteoporosis. You should begin tests for bone density at age 72. If you are younger than 72, ask your doctor whether you have factors that may increase your risk for this disease. You may want to have this test before age 72. · Heart attack and stroke risk. At least every 4 to 6 years, you should have your risk for heart attack and stroke assessed. Your doctor uses factors such as your age, blood pressure, cholesterol, and whether you smoke or have diabetes to show what your risk for a heart attack or stroke is over the next 10 years.   When should you call for help?  Watch closely for changes in your health, and be sure to contact your doctor if you have any problems or symptoms that concern you. Where can you learn more? Go to http://bertha-kate.info/. Enter F408 in the search box to learn more about \"Well Visit, Women 50 to 72: Care Instructions. \"  Current as of: December 13, 2018  Content Version: 12.2  © 7498-2013 [employer]. Care instructions adapted under license by [employer] (which disclaims liability or warranty for this information). If you have questions about a medical condition or this instruction, always ask your healthcare professional. [place] [address] any warranty or liability for your use of this information. Diabetes Foot Health: Care Instructions  Your Care Instructions    When you have diabetes, your feet need extra care and attention. Diabetes can damage the nerve endings and blood vessels in your feet, making you less likely to notice when your feet are injured. Diabetes also limits your body's ability to fight infection and get blood to areas that need it. If you get a minor foot injury, it could become an ulcer or a serious infection. With good foot care, you can prevent most of these problems. Caring for your feet can be quick and easy. Most of the care can be done when you are bathing or getting ready for bed. Follow-up care is a key part of your treatment and safety. Be sure to make and go to all appointments, and call your doctor if you are having problems. It's also a good idea to know your test results and keep a list of the medicines you take. How can you care for yourself at home? · Keep your blood sugar close to normal by watching what and how much you eat, monitoring blood sugar, taking medicines if prescribed, and getting regular exercise. · Do not smoke. Smoking affects blood flow and can make foot problems worse.  If you need help quitting, talk to your doctor about stop-smoking programs and medicines. These can increase your chances of quitting for good. · Eat a diet that is low in fats. High fat intake can cause fat to build up in your blood vessels and decrease blood flow. · Inspect your feet daily for blisters, cuts, cracks, or sores. If you cannot see well, use a mirror or have someone help you. · Take care of your feet:  ? Wash your feet every day. Use warm (not hot) water. Check the water temperature with your wrists or other part of your body, not your feet. ? Dry your feet well. Pat them dry. Do not rub the skin on your feet too hard. Dry well between your toes. If the skin on your feet stays moist, bacteria or a fungus can grow, which can lead to infection. ? Keep your skin soft. Use moisturizing skin cream to keep the skin on your feet soft and prevent calluses and cracks. But do not put the cream between your toes, and stop using any cream that causes a rash. ? Clean underneath your toenails carefully. Do not use a sharp object to clean underneath your toenails. Use the blunt end of a nail file or other rounded tool. ? Trim and file your toenails straight across to prevent ingrown toenails. Use a nail clipper, not scissors. Use an emery board to smooth the edges. · Change socks daily. Socks without seams are best, because seams often rub the feet. You can find socks for people with diabetes from specialty catalogs. · Look inside your shoes every day for things like gravel or torn linings, which could cause blisters or sores. · Buy shoes that fit well:  ? Look for shoes that have plenty of space around the toes. This helps prevent bunions and blisters. ? Try on shoes while wearing the kind of socks you will usually wear with the shoes. ? Avoid plastic shoes. They may rub your feet and cause blisters. Good shoes should be made of materials that are flexible and breathable, such as leather or cloth.   ? Break in new shoes slowly by wearing them for no more than an hour a day for several days. Take extra time to check your feet for red areas, blisters, or other problems after you wear new shoes. · Do not go barefoot. Do not wear sandals, and do not wear shoes with very thin soles. Thin soles are easy to puncture. They also do not protect your feet from hot pavement or cold weather. · Have your doctor check your feet during each visit. If you have a foot problem, see your doctor. Do not try to treat an early foot problem at home. Home remedies or treatments that you can buy without a prescription (such as corn removers) can be harmful. · Always get early treatment for foot problems. A minor irritation can lead to a major problem if not properly cared for early. When should you call for help? Call your doctor now or seek immediate medical care if:    · You have a foot sore, an ulcer or break in the skin that is not healing after 4 days, bleeding corns or calluses, or an ingrown toenail.     · You have blue or black areas, which can mean bruising or blood flow problems.     · You have peeling skin or tiny blisters between your toes or cracking or oozing of the skin.     · You have a fever for more than 24 hours and a foot sore.     · You have new numbness or tingling in your feet that does not go away after you move your feet or change positions.     · You have unexplained or unusual swelling of the foot or ankle.    Watch closely for changes in your health, and be sure to contact your doctor if:    · You cannot do proper foot care. Where can you learn more? Go to http://bertha-kate.info/. Enter A739 in the search box to learn more about \"Diabetes Foot Health: Care Instructions. \"  Current as of: April 16, 2019  Content Version: 12.2  © 9990-9912 [employer]. Care instructions adapted under license by [employer] (which disclaims liability or warranty for this information).  If you have questions about a medical condition or this instruction, always ask your healthcare professional. [address] any warranty or liability for your use of this information. Learning About Diabetes Food Guidelines  Your Care Instructions    Meal planning is important to manage diabetes. It helps keep your blood sugar at a target level (which you set with your doctor). You don't have to eat special foods. You can eat what your family eats, including sweets once in a while. But you do have to pay attention to how often you eat and how much you eat of certain foods. You may want to work with a dietitian or a certified diabetes educator (CDE) to help you plan meals and snacks. A dietitian or CDE can also help you lose weight if that is one of your goals. What should you know about eating carbs? Managing the amount of carbohydrate (carbs) you eat is an important part of healthy meals when you have diabetes. Carbohydrate is found in many foods. · Learn which foods have carbs. And learn the amounts of carbs in different foods. ? Bread, cereal, pasta, and rice have about 15 grams of carbs in a serving. A serving is 1 slice of bread (1 ounce), ½ cup of cooked cereal, or 1/3 cup of cooked pasta or rice. ? Fruits have 15 grams of carbs in a serving. A serving is 1 small fresh fruit, such as an apple or orange; ½ of a banana; ½ cup of cooked or canned fruit; ½ cup of fruit juice; 1 cup of melon or raspberries; or 2 tablespoons of dried fruit. ? Milk and no-sugar-added yogurt have 15 grams of carbs in a serving. A serving is 1 cup of milk or 2/3 cup of no-sugar-added yogurt. ? Starchy vegetables have 15 grams of carbs in a serving. A serving is ½ cup of mashed potatoes or sweet potato; 1 cup winter squash; ½ of a small baked potato; ½ cup of cooked beans; or ½ cup cooked corn or green peas.   · Learn how much carbs to eat each day and at each meal. A dietitian or CDE can teach you how to keep track of the amount of carbs you eat. This is called carbohydrate counting. · If you are not sure how to count carbohydrate grams, use the Plate Method to plan meals. It is a good, quick way to make sure that you have a balanced meal. It also helps you spread carbs throughout the day. ? Divide your plate by types of foods. Put non-starchy vegetables on half the plate, meat or other protein food on one-quarter of the plate, and a grain or starchy vegetable in the final quarter of the plate. To this you can add a small piece of fruit and 1 cup of milk or yogurt, depending on how many carbs you are supposed to eat at a meal.  · Try to eat about the same amount of carbs at each meal. Do not \"save up\" your daily allowance of carbs to eat at one meal.  · Proteins have very little or no carbs per serving. Examples of proteins are beef, chicken, turkey, fish, eggs, tofu, cheese, cottage cheese, and peanut butter. A serving size of meat is 3 ounces, which is about the size of a deck of cards. Examples of meat substitute serving sizes (equal to 1 ounce of meat) are 1/4 cup of cottage cheese, 1 egg, 1 tablespoon of peanut butter, and ½ cup of tofu. How can you eat out and still eat healthy? · Learn to estimate the serving sizes of foods that have carbohydrate. If you measure food at home, it will be easier to estimate the amount in a serving of restaurant food. · If the meal you order has too much carbohydrate (such as potatoes, corn, or baked beans), ask to have a low-carbohydrate food instead. Ask for a salad or green vegetables. · If you use insulin, check your blood sugar before and after eating out to help you plan how much to eat in the future. · If you eat more carbohydrate at a meal than you had planned, take a walk or do other exercise. This will help lower your blood sugar. What else should you know? · Limit saturated fat, such as the fat from meat and dairy products.  This is a healthy choice because people who have diabetes are at higher risk of heart disease. So choose lean cuts of meat and nonfat or low-fat dairy products. Use olive or canola oil instead of butter or shortening when cooking. · Don't skip meals. Your blood sugar may drop too low if you skip meals and take insulin or certain medicines for diabetes. · Check with your doctor before you drink alcohol. Alcohol can cause your blood sugar to drop too low. Alcohol can also cause a bad reaction if you take certain diabetes medicines. Follow-up care is a key part of your treatment and safety. Be sure to make and go to all appointments, and call your doctor if you are having problems. It's also a good idea to know your test results and keep a list of the medicines you take. Where can you learn more? Go to http://bertha-kate.info/. Enter P306 in the search box to learn more about \"Learning About Diabetes Food Guidelines. \"  Current as of: April 16, 2019  Content Version: 12.2  © 2256-3231 [employer]. Care instructions adapted under license by [employer] (which disclaims liability or warranty for this information). If you have questions about a medical condition or this instruction, always ask your healthcare professional. [address] any warranty or liability for your use of this information.

## 2019-10-09 LAB
ALBUMIN/CREAT UR: <6.6 MG/G CREAT (ref 0–30)
BUN SERPL-MCNC: 13 MG/DL (ref 6–24)
BUN/CREAT SERPL: 19 (ref 9–23)
CALCIUM SERPL-MCNC: 9.6 MG/DL (ref 8.7–10.2)
CHLORIDE SERPL-SCNC: 103 MMOL/L (ref 96–106)
CHOLEST SERPL-MCNC: 216 MG/DL (ref 100–199)
CO2 SERPL-SCNC: 22 MMOL/L (ref 20–29)
CREAT SERPL-MCNC: 0.68 MG/DL (ref 0.57–1)
CREAT UR-MCNC: 45.3 MG/DL
EST. AVERAGE GLUCOSE BLD GHB EST-MCNC: 140 MG/DL
GLUCOSE SERPL-MCNC: 118 MG/DL (ref 65–99)
HBA1C MFR BLD: 6.5 % (ref 4.8–5.6)
HDLC SERPL-MCNC: 57 MG/DL
INTERPRETATION, 910389: NORMAL
LDLC SERPL CALC-MCNC: 135 MG/DL (ref 0–99)
Lab: NORMAL
Lab: NORMAL
MICROALBUMIN UR-MCNC: <3 UG/ML
POTASSIUM SERPL-SCNC: 4.9 MMOL/L (ref 3.5–5.2)
SODIUM SERPL-SCNC: 142 MMOL/L (ref 134–144)
TRIGL SERPL-MCNC: 119 MG/DL (ref 0–149)
VLDLC SERPL CALC-MCNC: 24 MG/DL (ref 5–40)

## 2019-10-09 NOTE — PROGRESS NOTES
BMP normal kidney function and electrolytes   A1c 6.5%, well controlled. Can take metformin 500 mg XL one tab daily (not 2 tabs)  Lipid panel with high  (goal < 70), recommend she start statin for CV risk reduction. !if pt willing, will send rx. Main possible SE is myalgias, muscle weakness.  She should stop if she develops these sxs and call office

## 2019-10-11 ENCOUNTER — TELEPHONE (OUTPATIENT)
Dept: FAMILY MEDICINE CLINIC | Age: 54
End: 2019-10-11

## 2019-10-11 NOTE — TELEPHONE ENCOUNTER
Two patient identifiers confirmed (name and ). Patient notified of labs results . Patient verbalized understanding.

## 2020-01-31 ENCOUNTER — OFFICE VISIT (OUTPATIENT)
Dept: FAMILY MEDICINE CLINIC | Age: 55
End: 2020-01-31

## 2020-01-31 VITALS
BODY MASS INDEX: 24.84 KG/M2 | RESPIRATION RATE: 19 BRPM | OXYGEN SATURATION: 98 % | SYSTOLIC BLOOD PRESSURE: 113 MMHG | TEMPERATURE: 99.6 F | DIASTOLIC BLOOD PRESSURE: 73 MMHG | HEIGHT: 62 IN | WEIGHT: 135 LBS | HEART RATE: 78 BPM

## 2020-01-31 DIAGNOSIS — N30.90 CYSTITIS: Primary | ICD-10-CM

## 2020-01-31 LAB
BILIRUB UR QL STRIP: NORMAL
GLUCOSE UR-MCNC: NEGATIVE MG/DL
KETONES P FAST UR STRIP-MCNC: NEGATIVE MG/DL
PH UR STRIP: 5 [PH] (ref 4.6–8)
PROT UR QL STRIP: NORMAL
SP GR UR STRIP: 1.03 (ref 1–1.03)
UA UROBILINOGEN AMB POC: NORMAL (ref 0.2–1)
URINALYSIS CLARITY POC: CLEAR
URINALYSIS COLOR POC: YELLOW
URINE BLOOD POC: NORMAL
URINE LEUKOCYTES POC: NORMAL
URINE NITRITES POC: NEGATIVE

## 2020-01-31 RX ORDER — CEPHALEXIN 250 MG/1
CAPSULE ORAL
COMMUNITY
Start: 2020-01-29 | End: 2020-08-17 | Stop reason: ALTCHOICE

## 2020-01-31 RX ORDER — NITROFURANTOIN 25; 75 MG/1; MG/1
100 CAPSULE ORAL 2 TIMES DAILY
Qty: 10 CAP | Refills: 0 | Status: SHIPPED | OUTPATIENT
Start: 2020-01-31 | End: 2020-02-05

## 2020-01-31 NOTE — PROGRESS NOTES
Progress Note    Patient: Bijal Marino MRN: [id number]  SSN: [id number]    YOB: 1965  Age: 62 y.o. Sex: female        Chief Complaint   Patient presents with    Bladder Infection     X 3 days; painful alongside fever. No improvements with medication from urgent care facility (Cephalex)     Subjective:     Problems addressed:  Encounter Diagnoses     ICD-10-CM ICD-9-CM   1. Cystitis N30.90 595.9   Pt is a 63 y/o F who presents to the clinic due to dysuria. Current symptoms: dysuria. No other associated symptoms. No fever, urinary frequency, urgency, abdominal pain, hematuria or vaginal discharge. Symptoms started 3 days ago. Went to [facility] 2 days ago and was started on keflex. Pt reports that her symptoms are not any better after being on the abx for ~48 hours with no improvement in symptoms. Does not have hx of frequent UTIs    Current and past medical information:    Current Medications after this visit[de-identified]     Current Outpatient Medications   Medication Sig    nitrofurantoin, macrocrystal-monohydrate, (MACROBID) 100 mg capsule Take 1 Cap by mouth two (2) times a day for 5 days.  metFORMIN ER (GLUCOPHAGE XR) 500 mg tablet Take 2 Tabs by mouth daily (with breakfast).  cetirizine HCl (ZYRTEC PO) Take  by mouth.  MULTIVITAMINS (MULTIVITAMIN PO) Take  by mouth daily.  cephALEXin (KEFLEX) 250 mg capsule      No current facility-administered medications for this visit. Patient Active Problem List    Diagnosis Date Noted    Stress incontinence 05/08/2018    Lipid disorder 08/03/2017    FH: breast cancer 10/09/2012    Unspecified vitamin D deficiency 01/12/2012    Grief reaction 01/11/2012    Perimenopausal 01/11/2012    History of normal resting EKG 01/11/2012    S/P colonoscopy 01/11/2012    DM (diabetes mellitus), type 2 ([facility] [address] 08/12/2010       No past medical history on file.     No Known Allergies    Past Surgical History:   Procedure Laterality Date    HX GYN             Social History     Socioeconomic History    Marital status:      Spouse name: Not on file    Number of children: Not on file    Years of education: Not on file    Highest education level: Not on file   Tobacco Use    Smoking status: Former Smoker     Packs/day: 0.25     Last attempt to quit: 5/3/1989     Years since quittin.7    Smokeless tobacco: Never Used    Tobacco comment: quit    Substance and Sexual Activity    Alcohol use: Yes    Drug use: No    Sexual activity: Yes     Partners: Male     Review of Systems   Constitutional: Negative for chills and fever. Respiratory: Negative for cough, shortness of breath and wheezing. Cardiovascular: Negative for chest pain and palpitations. Gastrointestinal: Negative for abdominal pain, constipation, diarrhea, nausea and vomiting. Genitourinary: Positive for dysuria. Negative for flank pain, frequency, hematuria and urgency. Neurological: Negative for dizziness and headaches. Objective:     Vitals:    20 1618   BP: 113/73   Pulse: 78   Resp: 19   Temp: 99.6 °F (37.6 °C)   SpO2: 98%   Weight: 135 lb (61.2 kg)   Height: 5' 2.4\" (1.585 m)      Body mass index is 24.38 kg/m². Physical Exam  Constitutional:       Appearance: Normal appearance. HENT:      Head: Normocephalic and atraumatic. Cardiovascular:      Rate and Rhythm: Normal rate. Pulmonary:      Effort: Pulmonary effort is normal.      Breath sounds: Normal breath sounds. Abdominal:      General: There is no distension. Palpations: Abdomen is soft. Tenderness: There is no abdominal tenderness. There is no right CVA tenderness or left CVA tenderness. Neurological:      Mental Status: She is alert.           Health Maintenance Due   Topic Date Due    Shingrix Vaccine Age 50> (1 of 2) 2013    FOBT Q 1 YEAR AGE 50-75  2016    EYE EXAM RETINAL OR DILATED  2017    PAP AKA CERVICAL CYTOLOGY  2017    FOOT EXAM Q1  07/31/2019    Influenza Age 5 to Adult  08/01/2019    BREAST CANCER SCRN MAMMOGRAM  08/02/2019       Assessment and orders:     Encounter Diagnoses     ICD-10-CM ICD-9-CM   1. Cystitis N30.90 595.9     1. Cystitis: Still has dysuria on Keflex (perscribed at [facility]). Since Pt has been on keflex for only 48 hours, advised pt to continue keflex for 1 more day. Advised to switch to macrobid if there is no improvement in her symptoms. - Will send urine off for culture and advised Pt to follow up with [facility] regarding urine cx.   - AMB POC URINALYSIS DIP STICK AUTO W/O MICRO  - CULTURE, URINE; Future  - nitrofurantoin, macrocrystal-monohydrate, (MACROBID) 100 mg capsule; Take 1 Cap by mouth two (2) times a day for 5 days. - CULTURE, URINE  - f/u if symptoms do not improve with macrobid. F/u with any systemic signs of infection. Plan of care:  Discussed diagnoses in detail with patient. Medication risks/benefits/side effects discussed with patient. All of the patient's questions were addressed. The patient understands and agrees with our plan of care. The patient knows to call back if they are unsure of or forget any changes we discussed today or if the symptoms change. The patient received an After-Visit Summary which contains VS, orders, medication list and allergy list. This can be used as a \"mini-medical record\" should they have to seek medical care while out of town. Signed By: Venecia Frazier MD     January 31, 2020      Pt discussed with Dr. Alize Denise.

## 2020-01-31 NOTE — PROGRESS NOTES
Chief Complaint   Patient presents with    Bladder Infection     X 3 days; painful alongside fever.  No improvements with medication from urgent care facility (Cephalex)

## 2020-02-02 ENCOUNTER — TELEPHONE (OUTPATIENT)
Dept: FAMILY MEDICINE CLINIC | Age: 55
End: 2020-02-02

## 2020-02-02 DIAGNOSIS — B37.9 YEAST INFECTION: ICD-10-CM

## 2020-02-02 DIAGNOSIS — R19.5 YEAST IN STOOL: Primary | ICD-10-CM

## 2020-02-02 LAB — BACTERIA UR CULT: NO GROWTH

## 2020-02-02 RX ORDER — FLUCONAZOLE 150 MG/1
150 TABLET ORAL DAILY
Qty: 1 TAB | Refills: 0 | Status: SHIPPED | OUTPATIENT
Start: 2020-02-02 | End: 2020-02-03

## 2020-02-02 NOTE — TELEPHONE ENCOUNTER
I called the patient at [phone]. The patient states that her dysuria significantly improved after she started Macrobid. However she developed vaginal discharge associated with itching. She states that every time she has to take antibiotics she develops vaginal yeast infection. She stated that previously she took [address] [employer]. Sent the script for Diflucan to the pharmacy. Advised the patient to RTC if her symptoms persist.  Informed the patient and urine cx came back negative however because she was on antibiotic prior to the culture was obtained results may be altered. Advised to finish the course of Macrobid.   2:40 PM  2/2/2020  Urszula Giordano MD

## 2020-02-02 NOTE — TELEPHONE ENCOUNTER
In-coming call, pt states abx given on the 29th is not helping with the symptoms from the UTI. She would like something else sent into pharmacy asap.

## 2020-02-03 ENCOUNTER — TELEPHONE (OUTPATIENT)
Dept: FAMILY MEDICINE CLINIC | Age: 55
End: 2020-02-03

## 2020-02-03 NOTE — TELEPHONE ENCOUNTER
Called Pt to discuss urine culture results. Advised Pt that urine culture was neg and that she can stop taking macrobid and keflex. Reports that her dysuria has resolved but she has vaginal irritation/ discomfort.  She will follow up in clinic tomorrow for pelvic exam.

## 2020-02-04 ENCOUNTER — HOSPITAL ENCOUNTER (OUTPATIENT)
Dept: LAB | Age: 55
Discharge: HOME | End: 2020-02-04

## 2020-02-04 ENCOUNTER — OFFICE VISIT (OUTPATIENT)
Dept: FAMILY MEDICINE CLINIC | Age: 55
End: 2020-02-04

## 2020-02-04 VITALS
OXYGEN SATURATION: 96 % | RESPIRATION RATE: 19 BRPM | DIASTOLIC BLOOD PRESSURE: 76 MMHG | HEIGHT: 62 IN | SYSTOLIC BLOOD PRESSURE: 119 MMHG | TEMPERATURE: 98 F | WEIGHT: 132 LBS | HEART RATE: 96 BPM | BODY MASS INDEX: 24.29 KG/M2

## 2020-02-04 DIAGNOSIS — N76.0 VAGINITIS AND VULVOVAGINITIS: Primary | ICD-10-CM

## 2020-02-04 DIAGNOSIS — N89.8 VAGINAL DISCHARGE: ICD-10-CM

## 2020-02-04 DIAGNOSIS — N76.0 VAGINITIS AND VULVOVAGINITIS: ICD-10-CM

## 2020-02-04 LAB — WET MOUNT POCT, WMPOCT: NORMAL

## 2020-02-04 RX ORDER — TRIAMCINOLONE ACETONIDE 1 MG/G
OINTMENT TOPICAL 2 TIMES DAILY
Qty: 30 G | Refills: 0 | Status: SHIPPED | OUTPATIENT
Start: 2020-02-04 | End: 2020-08-17 | Stop reason: ALTCHOICE

## 2020-02-04 NOTE — PROGRESS NOTES
Progress Note    Patient: Jorge Sebastian MRN: [id number]  SSN: [id number]    YOB: 1965  Age: 62 y.o. Sex: female      Chief Complaint   Patient presents with    Follow-up     vaginal bleeding and burning during urination     Subjective:     Problems addressed:  Encounter Diagnoses     ICD-10-CM ICD-9-CM   1. Vaginitis and vulvovaginitis N76.0 616.10   2. Vaginal discharge N89.8 623.5      Pt is a 61 y/o F who presents to the clinic due to vaginal irritation and discharge. Was treated for UTI with Macrobid which pt was advised to stop since urine cx was negative in clinic and since urine culture at [facility] was negative (done prior to starting abx). Current symptoms: vaginal irritation, erythema, pruritis and clear/cloudy discharge. Symptoms started 1 week ago. Treatment: S/p diflucan 1x and currently using Monistat which was started 1 day ago. Symptoms not improved. No fever, dysuria, urinary frequency, urgency, abdominal pain,N/V/D. Current and past medical information:    Current Medications after this visit[de-identified]     Current Outpatient Medications   Medication Sig    triamcinolone acetonide (KENALOG) 0.1 % ointment Apply  to affected area two (2) times a day. use thin layer    cephALEXin (KEFLEX) 250 mg capsule     nitrofurantoin, macrocrystal-monohydrate, (MACROBID) 100 mg capsule Take 1 Cap by mouth two (2) times a day for 5 days.  metFORMIN ER (GLUCOPHAGE XR) 500 mg tablet Take 2 Tabs by mouth daily (with breakfast).  cetirizine HCl (ZYRTEC PO) Take  by mouth.  MULTIVITAMINS (MULTIVITAMIN PO) Take  by mouth daily. No current facility-administered medications for this visit.         Patient Active Problem List    Diagnosis Date Noted    Stress incontinence 05/08/2018    Lipid disorder 08/03/2017    FH: breast cancer 10/09/2012    Unspecified vitamin D deficiency 01/12/2012    Grief reaction 01/11/2012    Perimenopausal 01/11/2012    History of normal resting EKG 2012    S/P colonoscopy 2012    [facility] 2010       No past medical history on file. No Known Allergies    Past Surgical History:   Procedure Laterality Date    HX GYN             Social History     Socioeconomic History    Marital status:      Spouse name: Not on file    Number of children: Not on file    Years of education: Not on file    Highest education level: Not on file   Tobacco Use    Smoking status: Former Smoker     Packs/day: 0.25     Last attempt to quit: 5/3/1989     Years since quittin.7    Smokeless tobacco: Never Used    Tobacco comment: quit    Substance and Sexual Activity    Alcohol use: Yes    Drug use: No    Sexual activity: Yes     Partners: Male     Review of Systems : See HPI     Objective:     Vitals:    20 1014   BP: 119/76   Pulse: 96   Resp: 19   Temp: 98 °F (36.7 °C)   SpO2: 96%   Weight: 132 lb (59.9 kg)   Height: 5' 2.4\" (1.585 m)      Body mass index is 23.83 kg/m². Physical Exam  Constitutional:       Appearance: Normal appearance. HENT:      Head: Normocephalic and atraumatic. Cardiovascular:      Rate and Rhythm: Normal rate. Pulmonary:      Effort: Pulmonary effort is normal.      Breath sounds: Normal breath sounds. Abdominal:      General: There is no distension. Palpations: Abdomen is soft. Tenderness: There is no abdominal tenderness. There is no right CVA tenderness or left CVA tenderness. Neurological:      Mental Status: She is alert.  exam: chaperoned by Renea Rosen LPN. Erythema present on labia. Bloody discharge noted during speculum exam. Non-erythematous cervix and no discharge from cervix.        Health Maintenance Due   Topic Date Due    Shingrix Vaccine Age 50> (1 of 2) 2013    FOBT Q1Y Age 54-65  2016    Eye Exam Retinal or Dilated  2017    PAP AKA CERVICAL CYTOLOGY  2017    Foot Exam Q1  2019    Influenza Age 5 to Adult  08/01/2019    Breast Cancer Screen Mammogram  08/02/2019     Recent Results (from the past 12 hour(s))   AMB POC SMEAR, STAIN & INTERPRET, WET MOUNT    Collection Time: 02/04/20 11:13 AM   Result Value Ref Range    Wet mount (POC)       Assessment and orders:     Encounter Diagnoses     ICD-10-CM ICD-9-CM   1. Vaginitis and vulvovaginitis N76.0 616.10   2. Vaginal discharge N89.8 623.5     1. Vaginitis and vulvovaginitis: likely diagnosis considering UTI was ruled out and wet mount was neg for hyphae, clue cells or trich. Pt did use a bladder support device for urinary incontinence while on vacation 1 week ago which likely contributed to current symptoms. Pt does have erythema on labia, external triamcinolone will likely help erythema and irritation.   - Was not able perform pH so trich is still part of the differential. Can consider getting nu swab at follow up visit if symptoms are not improve. - Regarding abnormal bleeding; likely due to irritation at this time, advised pt to follow up with GYN if bleeding does not improve. Will need biopsy if she continues to have bleeding.   - CHLAMYDIA/GC PCR to rule out other etiology of current symptoms. - triamcinolone acetonide (KENALOG) 0.1 % ointment; advised Pt to use once a day and advised to only use for 7 days and only apply externally. - f/u in 1 week if symptoms do not improve. 2. Vaginal discharge  - AMB POC SMEAR, STAIN & INTERPRET, WET MOUNT    Plan of care:  Discussed diagnoses in detail with patient. Medication risks/benefits/side effects discussed with patient. All of the patient's questions were addressed. The patient understands and agrees with our plan of care. The patient knows to call back if they are unsure of or forget any changes we discussed today or if the symptoms change.      The patient received an After-Visit Summary which contains VS, orders, medication list and allergy list. This can be used as a \"mini-medical record\" should they have to seek medical care while out of town.       Signed By: Anayeli Shea MD     February 4, 2020      Pt discussed with Dr. Robert Hogan.

## 2020-02-04 NOTE — PATIENT INSTRUCTIONS
Vaginitis: Care Instructions  Your Care Instructions    Vaginitis is soreness or infection of the vagina. This common problem can cause itching and burning. And it can cause a change in vaginal discharge. Sometimes it can cause pain during sex. Vaginitis may be caused by bacteria, yeast, or other germs. Some infections that cause it are caught from a sexual partner. Bath products, spermicides, and douches can irritate the vagina too. Some women have this problem during and after menopause. A drop in estrogen levels during this time can cause dryness, soreness, and pain during sex. Your doctor can give you medicine to treat an infection. And home care may help you feel better. For certain types of infections, your sex partner must be treated too. Follow-up care is a key part of your treatment and safety. Be sure to make and go to all appointments, and call your doctor if you are having problems. It's also a good idea to know your test results and keep a list of the medicines you take. How can you care for yourself at home? · If your doctor prescribed antibiotics, take them as directed. Do not stop taking them just because you feel better. You need to take the full course of antibiotics. · Take your medicines exactly as prescribed. Call your doctor if you think you are having a problem with your medicine. · Do not eat or drink anything that has alcohol if you are taking metronidazole (Flagyl). · If you have a yeast infection, use over-the-counter products as your doctor tells you to. Or take medicine your doctor prescribes exactly as directed. · Wash your vaginal area daily with water. You also can use a mild, unscented soap if you want. · Do not use scented bath products. And do not use vaginal sprays or douches. · Put a washcloth soaked in cool water on the area to relieve itching. Or you can take cool baths.   · If you have dryness because of menopause, use estrogen cream or pills that your doctor prescribes. · Ask your doctor about when it is okay to have sex. · Use a personal lubricant before sex if you have dryness. Examples are Astroglide, K-Y Jelly, and Wet Lubricant Gel. · Ask your doctor if your sex partner also needs treatment. When should you call for help? Call your doctor now or seek immediate medical care if:    · You have a fever and pelvic pain.    Watch closely for changes in your health, and be sure to contact your doctor if:    · You have bleeding other than your period.     · You do not get better as expected. Where can you learn more? Go to http://bertha-kate.info/. Enter Y693 in the search box to learn more about \"Vaginitis: Care Instructions. \"  Current as of: February 19, 2019  Content Version: 12.2  © 3847-0891 [employer]. Care instructions adapted under license by [employer] (which disclaims liability or warranty for this information). If you have questions about a medical condition or this instruction, always ask your healthcare professional. [address] any warranty or liability for your use of this information.

## 2020-02-05 LAB
C TRACH DNA SPEC QL NAA+PROBE: NEGATIVE
N GONORRHOEA DNA SPEC QL NAA+PROBE: NEGATIVE
SAMPLE TYPE: NORMAL
SERVICE CMNT-IMP: NORMAL
SPECIMEN SOURCE: NORMAL

## 2020-02-05 NOTE — PROGRESS NOTES
[address] Medicine Residency Attending Addendum:  Patient encounter was discussed on the day of the encounter with Prince Lulu MD, performing the key elements of the service. I discussed the findings, assessment and plan with the resident and agree with the resident's findings and plan as documented in the resident's note.       Adan Donald MD, CAQSM, RMSK

## 2020-08-14 ENCOUNTER — TELEPHONE (OUTPATIENT)
Dept: FAMILY MEDICINE CLINIC | Age: 55
End: 2020-08-14

## 2020-08-14 NOTE — TELEPHONE ENCOUNTER
Called pt to verify their info and confirm their appt for Monday afternoon. LVM. If pt calls back please verify all info and confirm appt. Thanks.

## 2020-08-17 ENCOUNTER — VIRTUAL VISIT (OUTPATIENT)
Dept: FAMILY MEDICINE CLINIC | Age: 55
End: 2020-08-17
Payer: COMMERCIAL

## 2020-08-17 DIAGNOSIS — E11.9 TYPE 2 DIABETES MELLITUS WITHOUT COMPLICATION, UNSPECIFIED WHETHER LONG TERM INSULIN USE (HCC): Primary | ICD-10-CM

## 2020-08-17 PROCEDURE — 99213 OFFICE O/P EST LOW 20 MIN: CPT | Performed by: STUDENT IN AN ORGANIZED HEALTH CARE EDUCATION/TRAINING PROGRAM

## 2020-08-17 RX ORDER — METFORMIN HYDROCHLORIDE 500 MG/1
500 TABLET, EXTENDED RELEASE ORAL
Qty: 90 TAB | Refills: 4 | Status: SHIPPED | OUTPATIENT
Start: 2020-08-17 | End: 2021-06-15 | Stop reason: SDUPTHER

## 2020-08-17 NOTE — TELEPHONE ENCOUNTER
Spoke with patient. Use the number provided for the appointment. Marcell/telephne   Received: 2 days ago   Message Contents   Bryansaleem Isaacs, [address]               Pt is returning a call from Friday. Pts number is [phone].

## 2020-08-17 NOTE — PROGRESS NOTES
Ruthann Hensley  60 y.o. female  1965  [address] [place] [address]  [id number]   [address]:    Telemedicine Progress Note  Alaf Jhaveri DO       Encounter Date and Time: August 17, 2020 at 3:30 PM    Consent: Ruthann Hensley, who was seen by synchronous (real-time) audio-video technology, and/or her healthcare decision maker, is aware that this patient-initiated, Telehealth encounter on 8/17/2020 is a billable service, with coverage as determined by her insurance carrier. She is aware that she may receive a bill and has provided verbal consent to proceed: Yes. Chief Complaint   Patient presents with    Medication Refill     History of Present Illness   Ruthann Hensley is a 60 y.o. female was evaluated by synchronous (real-time) audio-video technology from home, through a secure patient portal.    Needs med refilled. Needs metformin refilled. Taking metformin 500mg daily. Patient was encouraged to start statin based on LDL of 135, but patient currently is refusing and wanted to try lifestyle modifications. Exercising more/diet modifications. Review of Systems   Review of Systems   Constitutional: Negative for fever. HENT: Negative for congestion. Respiratory: Negative for cough and shortness of breath. Cardiovascular: Negative for chest pain. Gastrointestinal: Negative for abdominal pain, nausea and vomiting. Genitourinary: Negative for dysuria, frequency and urgency. Musculoskeletal: Negative for myalgias.        Vitals/Objective:     General: alert, cooperative, no distress   Mental  status: mental status: alert, oriented to person, place, and time, normal mood, behavior, speech, dress, motor activity, and thought processes   Resp: resp: normal effort and no respiratory distress   Neuro: neuro: no gross deficits   Skin: skin: no discoloration or lesions of concern on visible areas   Due to this being a TeleHealth evaluation, many elements of the physical examination are unable to be assessed. Assessment and Plan:   Time-based coding, delete if not needed: I spent at least 10 minutes with this established patient, and >50% of the time was spent counseling and/or coordinating care regarding diabetes/med refill    1. Type 2 diabetes mellitus without complication, unspecified whether long term insulin use (HCC) Last A1C 6.5.  - metFORMIN ER (GLUCOPHAGE XR) 500 mg tablet; Take 1 Tab by mouth daily (with breakfast). Dispense: 90 Tab; Refill: 4  - HEMOGLOBIN A1C WITH EAG; Future    Time spent in direct conversation with the patient to include medical condition(s) discussed, assessment and treatment plan: 10 minutes       We discussed the expected course, resolution and complications of the diagnosis(es) in detail. Medication risks, benefits, costs, interactions, and alternatives were discussed as indicated. I advised her to contact the office if her condition worsens, changes or fails to improve as anticipated. She expressed understanding with the diagnosis(es) and plan. Patient understands that this encounter was a temporary measure, and the importance of further follow up and examination was emphasized. Patient verbalized understanding. Patient informed to follow up: Follow-up and Dispositions  ·   Return if symptoms worsen or fail to improve. Electronically Signed: Fabi JimiDO Dotty is a 60 y.o. female who was evaluated by an audio-video encounter for concerns as above. Patient identification was verified prior to start of the visit. A caregiver was present when appropriate. Due to this being a TeleHealth encounter (During [facility]-04 public health emergency), evaluation of the following organ systems was limited: Vitals/Constitutional/EENT/Resp/CV/GI//MS/Neuro/Skin/Heme-Lymph-Imm.   Pursuant to the emergency declaration under the [facility] [address] and Response Supplemental Appropriations Act, this Virtual Visit was conducted, with patient's (and/or legal guardian's) consent, to reduce the patient's risk of exposure to COVID-19 and provide necessary medical care. Services were provided through a synchronous discussion virtually to substitute for in-person clinic visit. I was at home. The patient was at home. History   Patients past medical, surgical and family histories were reviewed and updated. No past medical history on file. Past Surgical History:   Procedure Laterality Date    HX GYN           Family History   Problem Relation Age of Onset    Hypertension Mother      Social History     Socioeconomic History    Marital status:      Spouse name: Not on file    Number of children: Not on file    Years of education: Not on file    Highest education level: Not on file   Occupational History    Not on file   Social Needs    Financial resource strain: Not on file    Food insecurity     Worry: Not on file     Inability: Not on file    Transportation needs     Medical: Not on file     Non-medical: Not on file   Tobacco Use    Smoking status: Former Smoker     Packs/day: 0.25     Last attempt to quit: 5/3/1989     Years since quittin.3    Smokeless tobacco: Never Used    Tobacco comment: quit    Substance and Sexual Activity    Alcohol use:  Yes    Drug use: No    Sexual activity: Yes     Partners: Male   Lifestyle    Physical activity     Days per week: Not on file     Minutes per session: Not on file    Stress: Not on file   Relationships    Social connections     Talks on phone: Not on file     Gets together: Not on file     Attends Orthodoxy service: Not on file     Active member of club or organization: Not on file     Attends meetings of clubs or organizations: Not on file     Relationship status: Not on file    Intimate partner violence     Fear of current or ex partner: Not on file     Emotionally abused: Not on file     Physically abused: Not on file     Forced sexual activity: Not on file   Other Topics Concern    Not on file   Social History Narrative    Not on file     Patient Active Problem List   Diagnosis Code    DM (diabetes mellitus), type 2 ([facility] E11.9    Grief reaction F43.21    Perimenopausal N95.1    History of normal resting EKG [id number]    S/P colonoscopy Z98.890    Unspecified vitamin D deficiency E55.9    FH: breast cancer Z80.3    Lipid disorder E78.9    Stress incontinence N39.3          Current Medications/Allergies   Medications and Allergies reviewed:    Current Outpatient Medications   Medication Sig Dispense Refill    metFORMIN ER (GLUCOPHAGE XR) 500 mg tablet Take 1 Tab by mouth daily (with breakfast). 90 Tab 4    cetirizine HCl (ZYRTEC PO) Take  by mouth.        No Known Allergies

## 2020-08-20 ENCOUNTER — LAB ONLY (OUTPATIENT)
Dept: FAMILY MEDICINE CLINIC | Age: 55
End: 2020-08-20

## 2020-08-20 DIAGNOSIS — E11.9 TYPE 2 DIABETES MELLITUS WITHOUT COMPLICATION, UNSPECIFIED WHETHER LONG TERM INSULIN USE (HCC): ICD-10-CM

## 2020-08-20 LAB
EST. AVERAGE GLUCOSE BLD GHB EST-MCNC: 151 MG/DL
HBA1C MFR BLD: 6.9 % (ref 4–5.6)

## 2020-09-10 ENCOUNTER — TELEPHONE (OUTPATIENT)
Dept: FAMILY MEDICINE CLINIC | Age: 55
End: 2020-09-10

## 2020-09-10 NOTE — TELEPHONE ENCOUNTER
----- Message from Jimmy Kelly sent at 9/8/2020  4:18 PM EDT -----  Regarding: DR Rogelio King / Vicente Reyna Message/Vendor Calls    Pt is requesting to speak with nurse in regard to lab results.          Callback required   Best contact number(s):  21               Jimmy Kelly

## 2020-09-11 ENCOUNTER — TELEPHONE (OUTPATIENT)
Dept: FAMILY MEDICINE CLINIC | Age: 55
End: 2020-09-11

## 2020-09-11 NOTE — LETTER
9/11/2020 11:45 AM 
 
Ms. Kayden Molina [address] [facility] [address] [id number] To Whom It May Concern: Kayden Molina, Your A1C is 6.9, which is at goal. Keep up with your current medication. Lab Results Component Value Date/Time Hemoglobin A1c 6.9 (H) 08/20/2020 08:38 AM  
 
 
 
 
 
Sincerely, Leanne Ivey, DO

## 2020-09-11 NOTE — TELEPHONE ENCOUNTER
Good morning ,    I received a message stating patient needs to talk to me a bout the lab results that were done recently. Can you please advice on this and let me know?     Thank you

## 2021-03-20 ENCOUNTER — IMMUNIZATION (OUTPATIENT)
Dept: INTERNAL MEDICINE CLINIC | Age: 56
End: 2021-03-20
Payer: COMMERCIAL

## 2021-03-20 DIAGNOSIS — Z23 ENCOUNTER FOR IMMUNIZATION: Primary | ICD-10-CM

## 2021-03-20 PROCEDURE — 91300 COVID-19, MRNA, LNP-S, PF, 30MCG/0.3ML DOSE(PFIZER): CPT | Performed by: FAMILY MEDICINE

## 2021-03-20 PROCEDURE — 0001A COVID-19, MRNA, LNP-S, PF, 30MCG/0.3ML DOSE(PFIZER): CPT | Performed by: FAMILY MEDICINE

## 2021-04-10 ENCOUNTER — IMMUNIZATION (OUTPATIENT)
Dept: INTERNAL MEDICINE CLINIC | Age: 56
End: 2021-04-10
Payer: COMMERCIAL

## 2021-04-10 DIAGNOSIS — Z23 ENCOUNTER FOR IMMUNIZATION: Primary | ICD-10-CM

## 2021-04-10 PROCEDURE — 0002A COVID-19, MRNA, LNP-S, PF, 30MCG/0.3ML DOSE(PFIZER): CPT | Performed by: FAMILY MEDICINE

## 2021-04-10 PROCEDURE — 91300 COVID-19, MRNA, LNP-S, PF, 30MCG/0.3ML DOSE(PFIZER): CPT | Performed by: FAMILY MEDICINE

## 2021-06-15 ENCOUNTER — OFFICE VISIT (OUTPATIENT)
Dept: FAMILY MEDICINE CLINIC | Age: 56
End: 2021-06-15
Payer: COMMERCIAL

## 2021-06-15 VITALS
TEMPERATURE: 98.1 F | WEIGHT: 132 LBS | SYSTOLIC BLOOD PRESSURE: 112 MMHG | OXYGEN SATURATION: 97 % | HEART RATE: 70 BPM | RESPIRATION RATE: 16 BRPM | DIASTOLIC BLOOD PRESSURE: 78 MMHG | BODY MASS INDEX: 23.83 KG/M2

## 2021-06-15 DIAGNOSIS — E11.9 TYPE 2 DIABETES MELLITUS WITHOUT COMPLICATION, WITHOUT LONG-TERM CURRENT USE OF INSULIN (HCC): Primary | ICD-10-CM

## 2021-06-15 DIAGNOSIS — E78.9 LIPID DISORDER: ICD-10-CM

## 2021-06-15 LAB
ALBUMIN UR QL STRIP: 10 MG/L
CHOLEST SERPL-MCNC: 211 MG/DL
CREATININE, URINE POC: 100 MG/DL
EST. AVERAGE GLUCOSE BLD GHB EST-MCNC: 137 MG/DL
HBA1C MFR BLD: 6.4 % (ref 4–5.6)
HDLC SERPL-MCNC: 58 MG/DL
HDLC SERPL: 3.6 {RATIO} (ref 0–5)
LDLC SERPL CALC-MCNC: 123.6 MG/DL (ref 0–100)
MICROALBUMIN/CREAT RATIO POC: <30 MG/G
TRIGL SERPL-MCNC: 147 MG/DL (ref ?–150)
VLDLC SERPL CALC-MCNC: 29.4 MG/DL

## 2021-06-15 PROCEDURE — 99213 OFFICE O/P EST LOW 20 MIN: CPT | Performed by: STUDENT IN AN ORGANIZED HEALTH CARE EDUCATION/TRAINING PROGRAM

## 2021-06-15 PROCEDURE — 82044 UR ALBUMIN SEMIQUANTITATIVE: CPT | Performed by: STUDENT IN AN ORGANIZED HEALTH CARE EDUCATION/TRAINING PROGRAM

## 2021-06-15 RX ORDER — METFORMIN HYDROCHLORIDE 500 MG/1
1000 TABLET, EXTENDED RELEASE ORAL
Qty: 180 TABLET | Refills: 3 | Status: SHIPPED | OUTPATIENT
Start: 2021-06-15 | End: 2022-08-15

## 2021-06-15 NOTE — PROGRESS NOTES
Subjective   Juan Jose Kelly is an 60 y.o. female presents for follow up on diabetes. On metformin XR 1000mg daily. No SE. Saw eye doctor in March. Looking into work program that encourages checking POC glucose. Reviewed last A1C. Lab Results   Component Value Date/Time    Hemoglobin A1c 6.9 (H) 08/20/2020 08:38 AM    Hemoglobin A1c (POC) 6.0 10/12/2012 11:12 AM     Had mammogram last week at [address] [place] [address]. Review of Systems   Review of Systems   Constitutional: Negative for chills and fever. HENT: Negative for congestion. Respiratory: Negative for cough and shortness of breath. Cardiovascular: Negative for chest pain, palpitations and leg swelling. Gastrointestinal: Negative for abdominal pain, nausea and vomiting. Skin: Negative for rash. Allergies   No Known Allergies    Medications  Current Outpatient Medications   Medication Sig    metFORMIN ER (GLUCOPHAGE XR) 500 mg tablet Take 2 Tablets by mouth daily (with breakfast).  cetirizine HCl (ZYRTEC PO) Take  by mouth. No current facility-administered medications for this visit. Medical History  No past medical history on file. Immunizations   Immunization History   Administered Date(s) Administered    COVID-19, PFIZER, MRNA, LNP-S, PF, 30MCG/0.3ML DOSE 03/20/2021, 04/10/2021    Influenza Vaccine 11/10/2014    Influenza Vaccine Split 10/01/2011, 10/09/2012    Pneumococcal Polysaccharide (PPSV-23) 08/02/2017    Pneumococcal Vaccine (Pcv) 12/11/2008    TD Vaccine 02/02/2000    TDAP Vaccine 01/11/2012       Objective   Vital Signs  Visit Vitals  /78 (BP 1 Location: Left upper arm, BP Patient Position: Sitting, BP Cuff Size: Adult)   Pulse 70   Temp 98.1 °F (36.7 °C) (Oral)   Resp 16   Wt 132 lb (59.9 kg)   LMP 02/11/2011   SpO2 97%   BMI 23.83 kg/m²       Physical Examination  Physical Exam  Constitutional:       General: She is not in acute distress. Appearance: Normal appearance.    HENT:      Head: Normocephalic and atraumatic. Eyes:      Conjunctiva/sclera: Conjunctivae normal.   Cardiovascular:      Rate and Rhythm: Normal rate and regular rhythm. Pulses: Normal pulses. Heart sounds: No murmur heard. Pulmonary:      Effort: Pulmonary effort is normal. No respiratory distress. Breath sounds: Normal breath sounds. Abdominal:      General: There is no distension. Palpations: Abdomen is soft. Tenderness: There is no abdominal tenderness. Musculoskeletal:      Right lower leg: No edema. Left lower leg: No edema. Feet:      Comments: Diabetic Foot Exam:  Left: Reflexes 2+     Vibratory sensation normal    Proprioception normal    Filament test normal sensation with micro filament   Pulse DP: 2+ (normal)   Pulse PT: 2+ (normal)   Deformities: no onychomycosis, no hammer toe, no bunion   Edema: none   Fat pad at sole of foot: okay   Skin: normal   Motor: moves all extremities    Tarsal tunnel syndrome: no    Hot/Cold sensation: plantar aspect yes, dorsal aspect yes      Right: Reflexes 2+     Vibratory sensation normal    Proprioception normal    Filament test normal sensation with micro filament   Pulse DP: 2+ (normal)   Pulse PT: 2+ (normal)   Deformities: no onychomycosis, no hammer toe, no bunion   Edema: none   Fat pad at sole of foot: okay   Skin: normal   Motor: moves all extremities    Tarsal tunnel syndrome: no    Hot/Cold sensation: plantar aspect yes, dorsal aspect yes    Skin:     General: Skin is warm. Findings: No erythema. Neurological:      General: No focal deficit present. Mental Status: She is alert. Comments: Microfilament test negative.           Recent Results (from the past 12 hour(s))   AMB POC URINE, MICROALBUMIN, SEMIQUANT (3 RESULTS)    Collection Time: 06/15/21 10:37 AM   Result Value Ref Range    ALBUMIN, URINE POC 10 Negative mg/L    CREATININE, URINE  mg/dL    Microalbumin/creat ratio (POC) <30 <30 MG/G         Assessment   Pawan ivy a 60 y.o. who presents for diabetes follow up/labs. Plan   1. Type 2 diabetes mellitus without complication, without long-term current use of insulin ([facility]) Check A1C. Has seen eye doctor. Updated foot exam.  - HEMOGLOBIN A1C WITH EAG; Future  - AMB POC URINE, MICROALBUMIN, SEMIQUANT (3 RESULTS)  -  DIABETES FOOT EXAM  - metFORMIN ER (GLUCOPHAGE XR) 500 mg tablet; Take 2 Tablets by mouth daily (with breakfast). Dispense: 180 Tablet; Refill: 3  - HEMOGLOBIN A1C WITH EAG    2. Lipid disorder No meds. Due for lipid panel. Likely needs statin. - LIPID PANEL; Future  - LIPID PANEL          I have discussed the aforementioned diagnoses and plan with the patient in detail. I have provided information in person and/or in AVS. All questions answered prior to discharge.       I discussed this patient with Dr. Rufina Gurrola (Attending Physician)   Signed By:  Heidi Morfin DO    Family Medicine Resident

## 2021-06-15 NOTE — PROGRESS NOTES
Chief Complaint   Patient presents with    Diabetes     1. Have you been to the ER, urgent care clinic since your last visit? Hospitalized since your last visit? No    2. Have you seen or consulted any other health care providers outside of the [address] [place] [address] since your last visit? Include any pap smears or colon screening.  No

## 2021-06-18 NOTE — PROGRESS NOTES
A1C slightly improved to 6.4. Lipid panel abnormal and not at goal. ASCVD 4.0% but given LDL above goal and with hx of diabetes, recommend starting moderate intensity statin. Called patient to discuss results. Patient declines starting statin medication at this point and would like to continue lifestyle modifications.

## 2021-10-22 DIAGNOSIS — E11.9 TYPE 2 DIABETES MELLITUS WITHOUT COMPLICATION, WITHOUT LONG-TERM CURRENT USE OF INSULIN (HCC): ICD-10-CM

## 2021-10-22 RX ORDER — METFORMIN HYDROCHLORIDE 500 MG/1
TABLET, EXTENDED RELEASE ORAL
Qty: 90 TABLET | OUTPATIENT
Start: 2021-10-22

## 2021-10-28 NOTE — TELEPHONE ENCOUNTER
----- Message from Issa Jenkins sent at 10/26/2021 11:12 AM EDT -----  Subject: Medication Problem    QUESTIONS  Name of Medication? metFORMIN ER (GLUCOPHAGE XR) 500 mg tablet  Patient-reported dosage and instructions? 2 tablets per day   What question or problem do you have with the medication? Pharmacy wants   to know if there was an intentional change in patient dosage for 2 tablet   per day rather than the previously 1 tablet per day prescribed? Preferred Pharmacy? [facility] [address] phone number (if available)? [phone]  Additional Information for Provider? Please contact pharmacy , fax number   is [phone] if office needs to fax information over to pharmacy.   ---------------------------------------------------------------------------  --------------  [address]  What is the best way for the office to contact you? OK to leave message on   voicemail  Preferred Call Back Phone Number? [phone]  ---------------------------------------------------------------------------  --------------  SCRIPT ANSWERS  Relationship to Patient? Third Party  Representative Name?  [facility]

## 2021-11-03 NOTE — TELEPHONE ENCOUNTER
Adria Simeon, DO  You 2 days ago     [address], Are you able to call the number back for me? You can let them know that the patient is taking 2 tabs per day and needs to continue to do that for her diabetes.      Thanks,   Jan Puri, DO

## 2021-12-21 ENCOUNTER — TELEPHONE (OUTPATIENT)
Dept: FAMILY MEDICINE CLINIC | Age: 56
End: 2021-12-21

## 2021-12-21 NOTE — TELEPHONE ENCOUNTER
Hi Good Afternoon  Pt called requesting to do her metabolic panel lab, I see no orders. She said she will have some type of surgery, so she needs to get the metabolic panel lab done. Please let me know if the orders is sent, and I can call her to schedule for lab appt.   Please and thank you    -Yong Russell

## 2021-12-23 ENCOUNTER — VIRTUAL VISIT (OUTPATIENT)
Dept: FAMILY MEDICINE CLINIC | Age: 56
End: 2021-12-23
Payer: COMMERCIAL

## 2021-12-23 DIAGNOSIS — N39.3 STRESS INCONTINENCE: Primary | ICD-10-CM

## 2021-12-23 PROCEDURE — 99212 OFFICE O/P EST SF 10 MIN: CPT | Performed by: STUDENT IN AN ORGANIZED HEALTH CARE EDUCATION/TRAINING PROGRAM

## 2021-12-23 NOTE — PROGRESS NOTES
Yariel Helton  60 y.o. female  1965  [address] [place] [address]  [id number]   [address]:    Telemedicine Progress Note  Barron Montague MD       Encounter Date and Time: December 23, 2021 at 1:23 PM    Consent: Yariel Helton, who was seen by synchronous (real-time) audio-video technology, and/or her healthcare decision maker, is aware that this patient-initiated, Telehealth encounter on 12/23/2021 is a billable service, with coverage as determined by her insurance carrier. She is aware that she may receive a bill and has provided verbal consent to proceed: Yes. No chief complaint on file. History of Present Illness   Yariel Helton is a 60 y.o. female was evaluated by synchronous (real-time) audio-video technology from home, through a secure patient portal.    Urinary incontinence  Has procedure she trying to schedule w/ GYN Dr. Camilla Atwood and needs a CMP from her PCP prior to getting the procedure  Not having any other complaints    Review of Systems   Review of Systems   All other systems reviewed and are negative. Vitals/Objective:     General: alert, cooperative, no distress   Mental  status: mental status: alert, oriented to person, place, and time, normal mood, behavior, speech, dress, motor activity, and thought processes   Resp: resp: normal effort and no respiratory distress   Neuro: neuro: no gross deficits   Skin: skin: no discoloration or lesions of concern on visible areas   Due to this being a TeleHealth evaluation, many elements of the physical examination are unable to be assessed. Assessment and Plan:   Time-based coding, delete if not needed: I spent at least 15 minutes with this established patient, and >50% of the time was spent counseling and/or coordinating care regarding plan of care    1.  Stress incontinence  - Pt scheduled for lab appt 12/27 at 11:15am  - Will fax results to Dr. Daksha Leslie office (phone # [phone])  - METABOLIC PANEL, COMPREHENSIVE; Future      Assessment/Plan: as above    Time spent in direct conversation with the patient to include medical condition(s) discussed, assessment and treatment plan:       We discussed the expected course, resolution and complications of the diagnosis(es) in detail. Medication risks, benefits, costs, interactions, and alternatives were discussed as indicated. I advised her to contact the office if her condition worsens, changes or fails to improve as anticipated. She expressed understanding with the diagnosis(es) and plan. Patient understands that this encounter was a temporary measure, and the importance of further follow up and examination was emphasized. Patient verbalized understanding. Patient informed to follow up: for lab visit. Follow-up and Dispositions  ·   Return if symptoms worsen or fail to improve. Electronically Signed: Joceline Lee MD    CPT Codes 52039-13340 for Established Patients may apply to this Telehealth Visit. POS code: 18. Modifier GT    Ileana Khan is a 60 y.o. female who was evaluated by an audio-video encounter for concerns as above. Patient identification was verified prior to start of the visit. A caregiver was present when appropriate. Due to this being a TeleHealth encounter (During [place] [address] public health emergency), evaluation of the following organ systems was limited: Vitals/Constitutional/EENT/Resp/CV/GI//MS/Neuro/Skin/Heme-Lymph-Imm. Pursuant to the emergency declaration under the [facility] [address] [facility] [address] waiver authority and the [employer] and [employer] Act, this Virtual Visit was conducted, with patient's (and/or legal guardian's) consent, to reduce the patient's risk of exposure to COVID-19 and provide necessary medical care. Services were provided through a synchronous discussion virtually to substitute for in-person clinic visit. I was at home.  The patient was at home. History   Patients past medical, surgical and family histories were reviewed and updated. No past medical history on file. Past Surgical History:   Procedure Laterality Date    HX GYN           Family History   Problem Relation Age of Onset    Hypertension Mother      Social History     Socioeconomic History    Marital status:      Spouse name: Not on file    Number of children: Not on file    Years of education: Not on file    Highest education level: Not on file   Occupational History    Not on file   Tobacco Use    Smoking status: Former Smoker     Packs/day: 0.25     Quit date: 5/3/1989     Years since quittin.6    Smokeless tobacco: Never Used    Tobacco comment: quit    Substance and Sexual Activity    Alcohol use: Yes    Drug use: No    Sexual activity: Yes     Partners: Male   Other Topics Concern    Not on file   Social History Narrative    Not on file     Social Determinants of Health     Financial Resource Strain:     Difficulty of Paying Living Expenses: Not on file   Food Insecurity:     Worried About Running Out of Food in the Last Year: Not on file    Jose of Food in the Last Year: Not on file   Transportation Needs:     Lack of Transportation (Medical): Not on file    Lack of Transportation (Non-Medical):  Not on file   Physical Activity:     Days of Exercise per Week: Not on file    Minutes of Exercise per Session: Not on file   Stress:     Feeling of Stress : Not on file   Social Connections:     Frequency of Communication with Friends and Family: Not on file    Frequency of Social Gatherings with Friends and Family: Not on file    Attends Yazdanism Services: Not on file    Active Member of Clubs or Organizations: Not on file    Attends Club or Organization Meetings: Not on file    Marital Status: Not on file   Intimate Partner Violence:     Fear of Current or Ex-Partner: Not on file    Emotionally Abused: Not on file  Physically Abused: Not on file    Sexually Abused: Not on file   Housing Stability:     Unable to Pay for Housing in the Last Year: Not on file    Number of Places Lived in the Last Year: Not on file    Unstable Housing in the Last Year: Not on file     Patient Active Problem List   Diagnosis Code    DM (diabetes mellitus), type 2 ([facility] E11.9    Grief reaction F43.21    Perimenopausal N95.1    History of normal resting EKG [id number]    S/P colonoscopy Z98.890    Unspecified vitamin D deficiency E55.9    FH: breast cancer Z80.3    Lipid disorder E78.9    Stress incontinence N39.3          Current Medications/Allergies   Medications and Allergies reviewed:    Current Outpatient Medications   Medication Sig Dispense Refill    metFORMIN ER (GLUCOPHAGE XR) 500 mg tablet Take 2 Tablets by mouth daily (with breakfast). 180 Tablet 3    cetirizine HCl (ZYRTEC PO) Take  by mouth.        No Known Allergies

## 2021-12-25 NOTE — PROGRESS NOTES
[address] Medicine Residency Attending Addendum:  Dr. Karey Preciado MD,  the patient and I were not physically present during this encounter. The resident and I are concurrently monitoring the patient care through appropriate telecommunication technology. I discussed the findings, assessment and plan with the resident and agree with the resident's findings and plan as documented in the resident's note.       Angie Beckwith MD

## 2021-12-27 ENCOUNTER — LAB ONLY (OUTPATIENT)
Dept: FAMILY MEDICINE CLINIC | Age: 56
End: 2021-12-27

## 2021-12-27 DIAGNOSIS — N39.3 STRESS INCONTINENCE: ICD-10-CM

## 2021-12-27 LAB
ALBUMIN SERPL-MCNC: 3.7 G/DL (ref 3.5–5)
ALBUMIN/GLOB SERPL: 1 {RATIO} (ref 1.1–2.2)
ALP SERPL-CCNC: 100 U/L (ref 45–117)
ALT SERPL-CCNC: 34 U/L (ref 12–78)
ANION GAP SERPL CALC-SCNC: 3 MMOL/L (ref 5–15)
AST SERPL-CCNC: 19 U/L (ref 15–37)
BILIRUB SERPL-MCNC: 0.5 MG/DL (ref 0.2–1)
BUN SERPL-MCNC: 8 MG/DL (ref 6–20)
BUN/CREAT SERPL: 11 (ref 12–20)
CALCIUM SERPL-MCNC: 9.2 MG/DL (ref 8.5–10.1)
CHLORIDE SERPL-SCNC: 105 MMOL/L (ref 97–108)
CO2 SERPL-SCNC: 29 MMOL/L (ref 21–32)
CREAT SERPL-MCNC: 0.7 MG/DL (ref 0.55–1.02)
GLOBULIN SER CALC-MCNC: 3.8 G/DL (ref 2–4)
GLUCOSE SERPL-MCNC: 154 MG/DL (ref 65–100)
POTASSIUM SERPL-SCNC: 4.5 MMOL/L (ref 3.5–5.1)
PROT SERPL-MCNC: 7.5 G/DL (ref 6.4–8.2)
SODIUM SERPL-SCNC: 137 MMOL/L (ref 136–145)

## 2021-12-30 ENCOUNTER — TELEPHONE (OUTPATIENT)
Dept: FAMILY MEDICINE CLINIC | Age: 56
End: 2021-12-30

## 2021-12-30 NOTE — TELEPHONE ENCOUNTER
----- Message from Marichuy Tee sent at 12/30/2021  4:15 PM EST -----  Subject: Results Request    QUESTIONS  Which lab or imaging result is the patient calling about? labs  Which provider ordered the test?   At what location was the test performed? Date the test was performed? 2021-12-27  Additional Information for Provider?   ---------------------------------------------------------------------------  --------------  CALL BACK INFO  What is the best way for the office to contact you? OK to leave message on   voicemail  Preferred Call Back Phone Number?  [phone]

## 2022-01-03 ENCOUNTER — TELEPHONE (OUTPATIENT)
Dept: FAMILY MEDICINE CLINIC | Age: 57
End: 2022-01-03

## 2022-01-03 NOTE — TELEPHONE ENCOUNTER
[facility] called requesting to get the pts most recent labs done. Metabolic Panel on December 27th.     Please fax over to [phone] IADL retraining/ADL retraining/transfer training

## 2022-03-18 PROBLEM — E78.9 LIPID DISORDER: Status: ACTIVE | Noted: 2017-08-03

## 2022-03-20 PROBLEM — N39.3 STRESS INCONTINENCE: Status: ACTIVE | Noted: 2018-05-08

## 2022-08-14 NOTE — PROGRESS NOTES
HPI:  Mirna Roland is a 59 y.o. female presenting for well woman exam.     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  Present in all joints, pain comes and goes, achy  Occasionally takes aleve which helps    Sharp pain to jaw R side  Happened the other day suddenly while eating  Denies electric shock like  Used to happen in the past but has been years since she's had sx    T2DM  Previously was started on metformin but pt has not taken this in >1yr  Also advised to take statin but pt has preferred to focus on lifestyle modifications  Recently been on keto diet, lost weight intentionally    ALEXA  Pt recently had GYN surgery for bladder incontinence  Was told by anesthesiologist that she needed to f/up w/ sleep medicine for evaluation of ALEXA      Immunizations - reviewed:   Immunization History   Administered Date(s) Administered    COVID-19, PFIZER PURPLE top, DILUTE for use, (age 15 y+), IM, 30mcg/0.3mL 03/20/2021, 04/10/2021, 11/21/2021    Influenza Vaccine 11/10/2014    Influenza Vaccine (Quad) Mdck Pf (>2 Yrs Flucelvax QUAD [id number]) 10/25/2017, 11/09/2019    Influenza Vaccine PF 10/07/2015    Influenza Vaccine Split 10/01/2011, 10/09/2012    Pneumococcal Polysaccharide (PPSV-23) 08/02/2017    Pneumococcal Vaccine (Pcv) 12/11/2008    TD Vaccine 02/02/2000    TDAP Vaccine 01/11/2012    Zoster Recombinant 10/02/2021, 01/24/2022     Flu: needs  Tdap: UTD  Pneumovax (>64yo or earlier if risks): UTD per pt  Zostervax (>49yo): UTD - added from [address] Maintenance reviewed -  Pap smear See GYN annually  Mammogram sees GYN  Colonoscopy Last one >10yrs ago w/ Dr. Apple Hurley Colorectal specialist  DEXA scan (>64yo) NA  Hepatitis C testing negative  Lung cancer screening NA    Preventative care (USPSTF):  18:   Alcohol abuse screening (CAGE): negative    Depression screening: negative   No flowsheet data found. Hep C screening: negative   Tdap: utd      40:  Diabetes screening: A1c today      50:  Colonoscopy (if age > [phone])? >10yrs w/ Dr. Libby Arreola  Colonoscopy results: pt does not remember      54:  Lung cancer screening: NA     Allergies- reviewed:   No Known Allergies      Medications- reviewed:   No current outpatient medications on file. No current facility-administered medications for this visit. Past Medical History- reviewed:  No past medical history on file.       Past Surgical History- reviewed:   Past Surgical History:   Procedure Laterality Date    HX GYN               Family History - reviewed:  Family History   Problem Relation Age of Onset    Hypertension Mother          Social History - reviewed:  Social History     Socioeconomic History    Marital status:      Spouse name: Not on file    Number of children: Not on file    Years of education: Not on file    Highest education level: Not on file   Occupational History    Not on file   Tobacco Use    Smoking status: Former     Packs/day: 0.25     Types: Cigarettes     Quit date: 5/3/1989     Years since quittin.3    Smokeless tobacco: Never    Tobacco comments:     quit    Substance and Sexual Activity    Alcohol use: Yes    Drug use: No    Sexual activity: Yes     Partners: Male   Other Topics Concern    Not on file   Social History Narrative    Not on file     Social Determinants of Health     Financial Resource Strain: Not on file   Food Insecurity: Not on file   Transportation Needs: Not on file   Physical Activity: Not on file   Stress: Not on file   Social Connections: Not on file   Intimate Partner Violence: Not on file   Housing Stability: Not on file         Review of Systems   CONSTITUTIONAL: Denies: fever, chills  BREASTS: No masses or nipple discharge      Physical Exam  Visit Vitals  /72 (BP 1 Location: Right arm, BP Patient Position: Sitting, BP Cuff Size: Adult)   Pulse 62   Temp 98.1 °F (36.7 °C) (Oral)   Resp 18   Ht 5' 2.4\" (1.585 m)   Wt 113 lb (51.3 kg)   LMP 02/11/2011   SpO2 97%   BMI 20.40 kg/m²       General appearance - alert, well appearing, and in no distress  Ears - bilateral TM's and external ear canals normal  Nose - normal and patent, no erythema, discharge or polyps  Neck - supple, no significant adenopathy, thyroid exam: thyroid is normal in size without nodules or tenderness. Negative TMJ clicking, popping, TTP. Chest - clear to auscultation, no wheezes, rales or rhonchi, symmetric air entry  Heart - normal rate, regular rhythm, normal S1, S2, no murmurs, rubs, clicks or gallops  Abdomen - soft, nontender, nondistended, no masses or organomegaly  Neurological - alert, oriented, normal speech, no focal findings or movement disorder noted  Musculoskeletal - no joint tenderness, mild deformities to PIP joints b/l, no TTP, good ROM and strength  Extremities - no pedal edema noted  Skin - normal coloration and turgor, no rashes, no suspicious skin lesions noted    Diabetic Foot Exam:    Left: Reflexes 2+     Vibratory sensation normal    Proprioception normal    Filament test normal sensation with micro filament   Pulse DP: 2+ (normal)   Pulse PT: 2+ (normal)   Deformities: None, No onychomycosis   Motor: moves all extremities         Right: Reflexes 2+   Vibratory sensation normal   Proprioception normal   Filament test normal sensation with micro filament   Pulse DP: 2+ (normal)   Pulse PT: 2+ (normal)   Deformities: None, No onychomycosis   Motor: moves all extremities      Assessment/Plan:    ICD-10-CM ICD-9-CM    1.  Type 2 diabetes mellitus without complication, unspecified whether long term insulin use (HCC)  E11.9 250.00 CBC W/O DIFF      METABOLIC PANEL, BASIC      HEMOGLOBIN A1C WITH EAG      MICROALBUMIN, UR, RAND W/ MICROALB/CREAT RATIO       DIABETES FOOT EXAM      HEMOGLOBIN A1C WITH EAG      METABOLIC PANEL, BASIC      CBC W/O DIFF      2. Lipid disorder  E78.9 272.9 LIPID PANEL      LIPID PANEL      3. Screen for colon cancer  Z12.11 V76.51 REFERRAL TO COLON AND RECTAL SURGERY      CANCELED: REFERRAL TO GASTROENTEROLOGY      4. ALEXA (obstructive sleep apnea)  G47.33 327.23 SLEEP MEDICINE REFERRAL      5. Encounter for wellness examination in adult  Z00.00 V70.0           - Discussed conservative management for b/l hand OA, tylenol, aleve, diclofenac  - If affecting specific joints can try thumb brace etc    I have discussed the diagnosis with the patient and the intended plan as seen in the above orders. The patient has received an after-visit summary and questions were answered concerning future plans. I have discussed medication side effects and warnings with the patient as well. Informed pt to return to the office if new symptoms arise.       Maria Del Carmen Alvarado MD

## 2022-08-15 ENCOUNTER — PATIENT MESSAGE (OUTPATIENT)
Dept: SLEEP MEDICINE | Age: 57
End: 2022-08-15

## 2022-08-15 ENCOUNTER — OFFICE VISIT (OUTPATIENT)
Dept: FAMILY MEDICINE CLINIC | Age: 57
End: 2022-08-15
Payer: COMMERCIAL

## 2022-08-15 VITALS
SYSTOLIC BLOOD PRESSURE: 119 MMHG | OXYGEN SATURATION: 97 % | WEIGHT: 113 LBS | HEIGHT: 62 IN | HEART RATE: 62 BPM | TEMPERATURE: 98.1 F | BODY MASS INDEX: 20.8 KG/M2 | DIASTOLIC BLOOD PRESSURE: 72 MMHG | RESPIRATION RATE: 18 BRPM

## 2022-08-15 DIAGNOSIS — M19.049 ARTHRITIS OF HAND: ICD-10-CM

## 2022-08-15 DIAGNOSIS — E11.9 TYPE 2 DIABETES MELLITUS WITHOUT COMPLICATION, UNSPECIFIED WHETHER LONG TERM INSULIN USE (HCC): Primary | ICD-10-CM

## 2022-08-15 DIAGNOSIS — G47.33 OSA (OBSTRUCTIVE SLEEP APNEA): ICD-10-CM

## 2022-08-15 DIAGNOSIS — Z12.11 SCREEN FOR COLON CANCER: ICD-10-CM

## 2022-08-15 DIAGNOSIS — E78.2 MIXED HYPERLIPIDEMIA: ICD-10-CM

## 2022-08-15 DIAGNOSIS — Z00.00 ENCOUNTER FOR WELLNESS EXAMINATION IN ADULT: ICD-10-CM

## 2022-08-15 PROBLEM — E78.9 LIPID DISORDER: Status: RESOLVED | Noted: 2017-08-03 | Resolved: 2022-08-15

## 2022-08-15 LAB
ANION GAP SERPL CALC-SCNC: 7 MMOL/L (ref 5–15)
BUN SERPL-MCNC: 15 MG/DL (ref 6–20)
BUN/CREAT SERPL: 23 (ref 12–20)
CALCIUM SERPL-MCNC: 9.3 MG/DL (ref 8.5–10.1)
CHLORIDE SERPL-SCNC: 105 MMOL/L (ref 97–108)
CHOLEST SERPL-MCNC: 238 MG/DL
CO2 SERPL-SCNC: 27 MMOL/L (ref 21–32)
CREAT SERPL-MCNC: 0.65 MG/DL (ref 0.55–1.02)
CREAT UR-MCNC: 89.3 MG/DL
ERYTHROCYTE [DISTWIDTH] IN BLOOD BY AUTOMATED COUNT: 14.7 % (ref 11.5–14.5)
EST. AVERAGE GLUCOSE BLD GHB EST-MCNC: 128 MG/DL
GLUCOSE SERPL-MCNC: 113 MG/DL (ref 65–100)
HBA1C MFR BLD: 6.1 % (ref 4–5.6)
HCT VFR BLD AUTO: 44.9 % (ref 35–47)
HDLC SERPL-MCNC: 71 MG/DL
HDLC SERPL: 3.4 {RATIO} (ref 0–5)
HGB BLD-MCNC: 14.8 G/DL (ref 11.5–16)
LDLC SERPL CALC-MCNC: 143.8 MG/DL (ref 0–100)
MCH RBC QN AUTO: 29.7 PG (ref 26–34)
MCHC RBC AUTO-ENTMCNC: 33 G/DL (ref 30–36.5)
MCV RBC AUTO: 90 FL (ref 80–99)
MICROALBUMIN UR-MCNC: 0.58 MG/DL
MICROALBUMIN/CREAT UR-RTO: 6 MG/G (ref 0–30)
NRBC # BLD: 0 K/UL (ref 0–0.01)
NRBC BLD-RTO: 0 PER 100 WBC
PLATELET # BLD AUTO: 334 K/UL (ref 150–400)
PMV BLD AUTO: 10.6 FL (ref 8.9–12.9)
POTASSIUM SERPL-SCNC: 4 MMOL/L (ref 3.5–5.1)
RBC # BLD AUTO: 4.99 M/UL (ref 3.8–5.2)
SODIUM SERPL-SCNC: 139 MMOL/L (ref 136–145)
TRIGL SERPL-MCNC: 116 MG/DL (ref ?–150)
VLDLC SERPL CALC-MCNC: 23.2 MG/DL
WBC # BLD AUTO: 6.7 K/UL (ref 3.6–11)

## 2022-08-15 PROCEDURE — 99396 PREV VISIT EST AGE 40-64: CPT | Performed by: STUDENT IN AN ORGANIZED HEALTH CARE EDUCATION/TRAINING PROGRAM

## 2022-08-15 PROCEDURE — 99213 OFFICE O/P EST LOW 20 MIN: CPT | Performed by: STUDENT IN AN ORGANIZED HEALTH CARE EDUCATION/TRAINING PROGRAM

## 2022-08-15 NOTE — PROGRESS NOTES
Identified Patient with two Patient identifiers (Name and ). Two Patient Identifiers confirmed. Reviewed record in preparation for visit and have obtained necessary documentation. Chief Complaint   Patient presents with    Complete Physical       Visit Vitals  /72 (BP 1 Location: Right arm, BP Patient Position: Sitting, BP Cuff Size: Adult)   Pulse 62   Temp 98.1 °F (36.7 °C) (Oral)   Resp 18   Ht 5' 2.4\" (1.585 m)   Wt 113 lb (51.3 kg)   SpO2 97%   BMI 20.40 kg/m²       1. Have you been to the ER, urgent care clinic since your last visit? Hospitalized since your last visit? No    2. Have you seen or consulted any other health care providers outside of the [address] [place] [address] since your last visit? Include any pap smears or colon screening.  No

## 2022-08-16 ENCOUNTER — OFFICE VISIT (OUTPATIENT)
Dept: SLEEP MEDICINE | Age: 57
End: 2022-08-16
Payer: COMMERCIAL

## 2022-08-16 VITALS
DIASTOLIC BLOOD PRESSURE: 74 MMHG | HEIGHT: 62 IN | SYSTOLIC BLOOD PRESSURE: 129 MMHG | BODY MASS INDEX: 21.62 KG/M2 | OXYGEN SATURATION: 99 % | WEIGHT: 117.5 LBS | HEART RATE: 66 BPM

## 2022-08-16 DIAGNOSIS — G47.33 OSA (OBSTRUCTIVE SLEEP APNEA): Primary | ICD-10-CM

## 2022-08-16 PROCEDURE — 99204 OFFICE O/P NEW MOD 45 MIN: CPT | Performed by: SPECIALIST

## 2022-08-16 NOTE — PROGRESS NOTES
[address] [facility] [place], [address]  Tel.  [phone]  Fax. [phone] [facility]  [place], [address] [facility]  Tel.  [phone]  Fax. [phone] [address]  Tel.  [phone]  Fax. [phone]       Chief Complaint       Chief Complaint   Patient presents with    Sleep Problem     F2F NP for ALEXA       HPI      Kennedi Rodriguez is 59 y.o. female seen for evaluation of a sleep disorder. Her  as well as a friend on a recent trip has told her of snoring. Normally retires at 8: 30 PM and will get a bed at 6 AM.  She does not experience significant nonrestorative sleep or daytime sleepiness. She had a urological procedure earlier in the year. Following that procedure anesthesia told her that she \"needed a sleep study\". She was not provided with details in this regard. She denies vivid dreaming or nightmares, sleep talking or sleepwalking, bruxism, nocturnal incontinence, abnormal arm or leg movements, hypnagogue hallucinations, sleep paralysis or cataplexy. She notes having lost approximately 20 pounds; snoring apparently has improved. The patient has not undergone diagnostic testing for the current problems. [place] Sleepiness Score: (P) 4       No Known Allergies         She  has no past medical history of Psychiatric disorder. She  has a past surgical history that includes hx gyn. She family history includes Hypertension in her mother. She  reports that she quit smoking about 33 years ago. Her smoking use included cigarettes. She smoked an average of .25 packs per day. She has never used smokeless tobacco. She reports current alcohol use. She reports that she does not use drugs. Review of Systems:  Review of Systems   HENT:  Negative for hearing loss and tinnitus. Eyes:  Negative for blurred vision and double vision. Respiratory:  Negative for cough and shortness of breath. Cardiovascular:  Negative for chest pain and palpitations. Gastrointestinal:  Negative for abdominal pain and heartburn. Genitourinary:  Negative for frequency and urgency. Musculoskeletal:  Negative for back pain and neck pain. Neurological:  Negative for dizziness and headaches. Psychiatric/Behavioral:  Negative for depression. The patient is not nervous/anxious. Objective:   Visit Vitals  /74 (BP 1 Location: Left upper arm, BP Patient Position: Sitting)   Pulse 66   Ht 5' 2\" (1.575 m)   Wt 117 lb 8 oz (53.3 kg)   LMP 02/11/2011   SpO2 99%   BMI 21.49 kg/m²     Body mass index is 21.49 kg/m². General:   Conversant, cooperative   Eyes:  Pupils equal and reactive, no nystagmus   Oropharynx:   Mallampati score II, tongue  mildly scalloped       Neck:   No carotid bruits; Neck circ. in \"inches\": 14   Chest/Lungs:  Clear on auscultation    CVS:  Normal rate, regular rhythm   Skin:  Warm to touch; no obvious rashes   Neuro:  Speech fluent, face symmetrical, tongue movement normal   Psych:  Normal affect,  normal countenance        Assessment:       ICD-10-CM ICD-9-CM    1. ALEXA (obstructive sleep apnea)  G47.33 327.23 SLEEP STUDY UNATTENDED, 4 CHANNEL        Potential sleep disordered breathing. Plan:     Orders Placed This Encounter    SLEEP STUDY UNATTENDED, 4 CHANNEL     Order Specific Question:   Reason for Exam     Answer:   snoring       * Patient has a history and examination consistent with the diagnosis of sleep apnea. *Home sleep testing was ordered for initial evaluation. * She was provided information on sleep apnea including corresponding risk factors and the importance of proper treatment. * Treatment options if indicated were reviewed today. Instructions:  Do not engage in activities requiring a normal degree of alertness if fatigue is present.   The patient understands that untreated or undertreated sleep apnea could impair judgement and the ability to function normally during the day. Call or return if symptoms worsen or persist.          Ludmila Barcenas MD, [facility]  Electronically signed 08/16/22       This note was created using voice recognition software. Despite editing, there may be syntax errors. This note will not be viewable in [address]

## 2022-08-24 ENCOUNTER — TELEPHONE (OUTPATIENT)
Dept: FAMILY MEDICINE CLINIC | Age: 57
End: 2022-08-24

## 2022-08-24 NOTE — PROGRESS NOTES
A1c at goal, diet controlled. Urine microalbumin, BMP, CBC wnl. Lipid panel still shows persistently elevated Tchol and LDL - statin is recommended however pt has been resistant to taking this medication in the past. Attempted to call pt to discuss multiple times w/ no answer, will send mychart and letter.

## 2022-08-24 NOTE — TELEPHONE ENCOUNTER
Patient called stating that she was returning a phone call from Dr. Yoselyn Ansari. She also wanted to know if her test results could be faxed and mailed to her. If that is possible the fax number is [phone]. She would like to be contacted at the earliest convenience.

## 2022-10-10 ENCOUNTER — OFFICE VISIT (OUTPATIENT)
Dept: SLEEP MEDICINE | Age: 57
End: 2022-10-10

## 2022-10-10 ENCOUNTER — HOSPITAL ENCOUNTER (OUTPATIENT)
Dept: SLEEP MEDICINE | Age: 57
Discharge: HOME | End: 2022-10-10
Payer: COMMERCIAL

## 2022-10-10 DIAGNOSIS — G47.33 OSA (OBSTRUCTIVE SLEEP APNEA): Primary | ICD-10-CM

## 2022-10-10 PROCEDURE — 95806 SLEEP STUDY UNATT&RESP EFFT: CPT | Performed by: SPECIALIST

## 2022-10-12 ENCOUNTER — TELEPHONE (OUTPATIENT)
Dept: SLEEP MEDICINE | Age: 57
End: 2022-10-12

## 2022-10-12 DIAGNOSIS — G47.33 OSA (OBSTRUCTIVE SLEEP APNEA): Primary | ICD-10-CM

## 2022-10-17 NOTE — TELEPHONE ENCOUNTER
HSAT performed for potential sleep disordered breathing. Study demonstrated normal AHI 1.1/h associated with minimal SPO2 of 89%. Snoring during 1%. HSAT potentially underestimated severity of sleep disordered breathing. Recommendation: Attended PSG    Sleep technologist: Please advise patient of HSAT results. Order has been entered for attended study.

## 2022-11-09 ENCOUNTER — HOSPITAL ENCOUNTER (OUTPATIENT)
Dept: SLEEP MEDICINE | Age: 57
Discharge: HOME | End: 2022-11-09
Payer: COMMERCIAL

## 2022-11-09 VITALS
HEIGHT: 62 IN | TEMPERATURE: 96.9 F | SYSTOLIC BLOOD PRESSURE: 113 MMHG | DIASTOLIC BLOOD PRESSURE: 74 MMHG | HEART RATE: 74 BPM | BODY MASS INDEX: 21.53 KG/M2 | OXYGEN SATURATION: 97 % | WEIGHT: 117 LBS

## 2022-11-09 DIAGNOSIS — G47.33 OSA (OBSTRUCTIVE SLEEP APNEA): ICD-10-CM

## 2022-11-09 PROCEDURE — 95810 POLYSOM 6/> YRS 4/> PARAM: CPT | Performed by: SPECIALIST

## 2022-11-10 NOTE — PROGRESS NOTES
[address] [place], [address]  Tel.  [phone]  Fax. 100 [facility] 60  [place], [address] [facility]  Tel.  [phone]  Fax. [phone] [address] [facility] Yeyo Wade  Tel.  [phone]  Fax. [phone]     Sleep Study Technical Notes        PRE-Test:  Lisseth Scott (: 1965) arrived to the sleep center for her PSG. Patient was greeted and taken directly to her room. Vitals:  Temperature (96.9)   BP (113/74)   SaO2 (97%)   Weight per patient (117lbs)  Procedure explained to the patient and questions were answered. The patient expressed understanding of the procedure. Electrodes were applied without incident. The patient was placed in bed and the study was started. Acquisition Notes:  Lights off: 10:08pm with sleep onset approx 10:46pm.  Sleep stages observed; N1, N2, N3 and REM. Patient slept on both left and right sides. Patient did have an audible mumble during REM period. Respiratory events: Hypopnea  ECG:  NSR  Snoring:  Mild to moderate  Positional therapy with: Other comments: NA  Patient had caregiver in attendance:  No  Patient watched TV or on phone after lights out for 45 minutes  Patient slept with positional therapy:  No  Patient slept with head of bed elevated:  No  Patient wore an oral appliance:  No  Patient to bathroom 0 times        POST Test:  Patient was awakened. Electrodes were removed. The patient was discharged after answering the Post Questionnaire. Patient stated that she was alert and ok to drive. Equipment and room cleaned per infection control policy.

## 2022-12-01 ENCOUNTER — TELEPHONE (OUTPATIENT)
Dept: SLEEP MEDICINE | Age: 57
End: 2022-12-01

## 2022-12-08 NOTE — TELEPHONE ENCOUNTER
Patient called requesting results from 11/12 study. She stated that she is not going to pay her bill until she receives the results.

## 2022-12-12 NOTE — TELEPHONE ENCOUNTER
Patient called again requesting results. States she will not pay bill for study until she receives her results.

## 2022-12-13 NOTE — TELEPHONE ENCOUNTER
PSG performed for potential sleep disordered breathing. 387.5 minutes recorded of which 313.5 minutes spent asleep with a sleep efficiency of 80.9%. Sleep onset at 39.9 minutes; REM onset at 112 minutes with total REM representing 22.5% of sleep time. All sleep stages observed. Two hypopnea occurred. AHI normal at 0.4/h. Minimal SaO2 90%. Impression: PSG did not demonstrate significant sleep disordered breathing. Sleep technologist: Please advise patient of study results.

## 2023-01-09 NOTE — PROGRESS NOTES
[address] [facility] [place], [address]  Tel.  [phone]  Fax. 100 [facility] 60  [place], [address] [facility]  Tel.  [phone]  Fax. [phone] 9[address] [facility] SheriJose [facility]  Tel.  [phone]  Fax. [phone]       S>Susan Lowe is a 59 y.o. female seen today to receive a home sleep testing unit (HST). Patient was educated on proper hookup and operation of the HST. Instruction forms and documentation were reviewed and signed. The patient demonstrated good understanding of the HST. O>    Visit Vitals  LMP 02/11/2011         A>  No diagnosis found. P>  General information regarding operations and maintenance of the device was provided. She was provided information on sleep apnea including coresponding risk factors and the importance of proper treatment. Follow-up appointment was made to return the HST. She will be contacted once the results have been reviewed. She was asked to contact our office for any problems regarding her home sleep test study.   \[facility]\""T #[id number] [FreeTextEntry1] :  no orthopedic intervention planned at this time light exercise is to be encouraged tramadol as per pain management she remains totally disabled unable to work I will see her back in a few months  will consider starting some therapy

## 2023-04-13 PROBLEM — Z53.20 STATIN MEDICATION DECLINED BY PATIENT: Status: ACTIVE | Noted: 2023-04-13
